# Patient Record
Sex: MALE | Race: WHITE | NOT HISPANIC OR LATINO | Employment: PART TIME | URBAN - METROPOLITAN AREA
[De-identification: names, ages, dates, MRNs, and addresses within clinical notes are randomized per-mention and may not be internally consistent; named-entity substitution may affect disease eponyms.]

---

## 2017-03-06 ENCOUNTER — ALLSCRIPTS OFFICE VISIT (OUTPATIENT)
Dept: OTHER | Facility: OTHER | Age: 22
End: 2017-03-06

## 2017-06-19 ENCOUNTER — ALLSCRIPTS OFFICE VISIT (OUTPATIENT)
Dept: OTHER | Facility: OTHER | Age: 22
End: 2017-06-19

## 2018-01-12 VITALS
BODY MASS INDEX: 26.47 KG/M2 | HEIGHT: 74 IN | TEMPERATURE: 99.2 F | DIASTOLIC BLOOD PRESSURE: 68 MMHG | SYSTOLIC BLOOD PRESSURE: 122 MMHG | HEART RATE: 80 BPM | RESPIRATION RATE: 16 BRPM | WEIGHT: 206.25 LBS

## 2018-01-14 VITALS
TEMPERATURE: 97.1 F | HEIGHT: 74 IN | DIASTOLIC BLOOD PRESSURE: 72 MMHG | RESPIRATION RATE: 16 BRPM | BODY MASS INDEX: 27.35 KG/M2 | SYSTOLIC BLOOD PRESSURE: 114 MMHG | HEART RATE: 68 BPM | WEIGHT: 213.13 LBS

## 2018-01-15 NOTE — PROGRESS NOTES
Assessment    1  Encounter for preventive health examination (V70 0) (Z00 00)    Discussion/Summary    Impression:   No growth, development, elimination, feeding and skin concerns  no medical problems  Anticipatory guidance addressed as per the history of present illness section  No vaccines needed  He is not on any medications  27year-old male for college and [facility] physical  All immunizations up-to-date  Low risk medically for [facility] participation  Forms completed  Self Referrals: No      History of Present Illness  HM, 12-18 years Male (Brief): Madonna Saenz presents today for routine health maintenance with his mother  General Health: The child's health since the last visit is described as good  Dental hygiene: Good  Immunization status: Up to date  Caregiver concerns:   Caregivers deny concerns regarding nutrition, sleep, behavior, development and elimination  Nutrition/Elimination:   Diet:  his current diet is diverse and healthy  No elimination issues are expressed  Sleep:  No sleep issues are reported  Behavior: The child's temperament is described as calm, happy and independent  No behavior issues identified  Health Risks:  No significant risk factors are identified  Safety elements used:   safety elements were discussed and are adequate  Childcare/School: He is College  School performance has been excellent  Sports Participation Questions:   HPI: 27year-old male presents for college and [facility] physical  The patient has no medical problems and is up-to-date with immunizations  No history of exertional dyspnea, chest pain, syncope  No premature cardiac death in the family  Review of Systems    Constitutional: not feeling tired, no fever, not feeling poorly and no chills  Eyes: no eye pain and no eyesight problems  ENT: no nasal discharge  Cardiovascular: no chest pain and no lower extremity edema     Respiratory: no wheezing, no shortness of breath, no cough and no shortness of breath during exertion  Gastrointestinal: no abdominal pain, no nausea, no vomiting and no diarrhea  Integumentary: no rashes  Neurological: no headache, no numbness, no tingling and no dizziness  Hematologic/Lymphatic: no swollen glands  ROS reported by the patient  Active Problems    1  History of Need for HPV vaccination (V04 89) (Z23)   2  History of Need for HPV vaccination (V04 89) (Z23)   3  History of Need for HPV vaccination (V04 89) (Z23)   4  History of Need for prophylactic vaccination and inoculation against bacterial diseases   (V03 9) (Z23)    Past Medical History    · History of Abrasion Of Right Foot (917 0)   · History of Acute upper respiratory infection (465 9) (J06 9)   · History of shortness of breath (V13 89) (J89 874)   · History of Need for HPV vaccination (V04 89) (Z23)   · History of Need for HPV vaccination (V04 89) (Z23)   · History of Need for HPV vaccination (V04 89) (Z23)   · History of Need for prophylactic vaccination and inoculation against bacterial diseases  (V03 9) (Z23)    Surgical History    · History of Myringoplasty   · History of Oral Surgery Tooth Extraction    Family History  Mother    · Family history of Hyperlipidemia  Maternal Grandfather    · Family history of Diabetes Mellitus (V18 0)   · Family history of malignant neoplasm of prostate (V16 42) (Z80 42)   · Family history of pancreatic cancer (V16 0) (Z80 0)   · Family history of Hypertension (V17 49)    Social History    · Never A Smoker   · Uses Safety Equipment - Seatbelts    Current Meds   1  No Reported Medications Recorded    Allergies    1  No Known Drug Allergies    2  Dust   3  Other   4   Pollen    Vitals   Recorded: 23UOH4288 08:59AM   Temperature 97 8 F   Heart Rate 72   Respiration 14   Systolic 258   Diastolic 60   Height 6 ft 1 5 in   Weight 195 lb 6 08 oz   BMI Calculated 25 43   BSA Calculated 2 14   Pain Scale 0     Physical Exam    Constitutional - General appearance: No acute distress, well appearing and well nourished  Head and Face - Head and face: Normocephalic, atraumatic  Eyes - Conjunctiva and lids: No injection, edema or discharge  Pupils and irises: Equal, round, reactive to light bilaterally  Ears, Nose, Mouth, and Throat - External inspection of ears and nose: Normal without deformities or discharge  Oropharynx: Moist mucosa, normal tongue and tonsils without lesions  Neck - Neck: Supple, symmetric, no masses  Pulmonary - Respiratory effort: Normal respiratory rate and rhythm, no increased work of breathing  Auscultation of lungs: Clear bilaterally  Cardiovascular - Auscultation of heart: Regular rate and rhythm, normal S1 and S2, no murmur  Examination of extremities for edema and/or varicosities: Normal    Chest - Chest: Normal    Abdomen - Abdomen: Normal bowel sounds, soft, non-tender, no masses  Lymphatic - Palpation of lymph nodes in neck: No anterior or posterior cervical lymphadenopathy  Musculoskeletal - Gait and station: Normal gait  Range of motion: Normal  Muscle strength/tone: Normal    Skin - Skin and subcutaneous tissue: No rash or lesions  Neurologic - Cranial nerves: Normal  Coordination: Normal    Psychiatric - judgment and insight: Normal  Mood and affect: Normal       Signatures   Electronically signed by :  Deon Engel MD; Jun 16 2016 10:04AM EST                       (Author)

## 2018-05-16 DIAGNOSIS — R45.89 DEPRESSED MOOD: ICD-10-CM

## 2018-05-16 DIAGNOSIS — R53.83 OTHER FATIGUE: Primary | ICD-10-CM

## 2018-05-16 DIAGNOSIS — Z13.220 SCREENING FOR LIPOID DISORDERS: ICD-10-CM

## 2018-05-17 ENCOUNTER — TELEPHONE (OUTPATIENT)
Dept: FAMILY MEDICINE CLINIC | Facility: CLINIC | Age: 23
End: 2018-05-17

## 2018-05-17 NOTE — TELEPHONE ENCOUNTER
Lab papers are up form fro  or if they call back please ask where they would like them faxed to   Which [facility] location

## 2018-05-22 LAB
ALBUMIN SERPL-MCNC: 4.3 G/DL (ref 3.6–5.1)
ALBUMIN/GLOB SERPL: 1.8 (CALC) (ref 1–2.5)
ALP SERPL-CCNC: 125 U/L (ref 40–115)
ALT SERPL-CCNC: 29 U/L (ref 9–46)
AST SERPL-CCNC: 22 U/L (ref 10–40)
BASOPHILS # BLD AUTO: 78 CELLS/UL (ref 0–200)
BASOPHILS NFR BLD AUTO: 1.1 %
BILIRUB SERPL-MCNC: 0.4 MG/DL (ref 0.2–1.2)
BUN SERPL-MCNC: 10 MG/DL (ref 7–25)
BUN/CREAT SERPL: ABNORMAL (CALC) (ref 6–22)
CALCIUM SERPL-MCNC: 9.7 MG/DL (ref 8.6–10.3)
CHLORIDE SERPL-SCNC: 103 MMOL/L (ref 98–110)
CHOLEST SERPL-MCNC: 145 MG/DL
CHOLEST/HDLC SERPL: 4.1 (CALC)
CO2 SERPL-SCNC: 28 MMOL/L (ref 20–31)
CREAT SERPL-MCNC: 0.98 MG/DL (ref 0.6–1.35)
EOSINOPHIL # BLD AUTO: 114 CELLS/UL (ref 15–500)
EOSINOPHIL NFR BLD AUTO: 1.6 %
ERYTHROCYTE [DISTWIDTH] IN BLOOD BY AUTOMATED COUNT: 12.1 % (ref 11–15)
GLOBULIN SER CALC-MCNC: 2.4 G/DL (CALC) (ref 1.9–3.7)
GLUCOSE SERPL-MCNC: 89 MG/DL (ref 65–99)
HCT VFR BLD AUTO: 46.2 % (ref 38.5–50)
HDLC SERPL-MCNC: 35 MG/DL
HGB BLD-MCNC: 15.9 G/DL (ref 13.2–17.1)
LDLC SERPL CALC-MCNC: 85 MG/DL (CALC)
LYMPHOCYTES # BLD AUTO: 2499 CELLS/UL (ref 850–3900)
LYMPHOCYTES NFR BLD AUTO: 35.2 %
MCH RBC QN AUTO: 29.1 PG (ref 27–33)
MCHC RBC AUTO-ENTMCNC: 34.4 G/DL (ref 32–36)
MCV RBC AUTO: 84.5 FL (ref 80–100)
MONOCYTES # BLD AUTO: 575 CELLS/UL (ref 200–950)
MONOCYTES NFR BLD AUTO: 8.1 %
NEUTROPHILS # BLD AUTO: 3834 CELLS/UL (ref 1500–7800)
NEUTROPHILS NFR BLD AUTO: 54 %
NONHDLC SERPL-MCNC: 110 MG/DL (CALC)
PLATELET # BLD AUTO: 311 THOUSAND/UL (ref 140–400)
PMV BLD REES-ECKER: 10.4 FL (ref 7.5–12.5)
POTASSIUM SERPL-SCNC: 4.5 MMOL/L (ref 3.5–5.3)
PROT SERPL-MCNC: 6.7 G/DL (ref 6.1–8.1)
RBC # BLD AUTO: 5.47 MILLION/UL (ref 4.2–5.8)
SL AMB EGFR AFRICAN AMERICAN: 128 ML/MIN/1.73M2
SL AMB EGFR NON AFRICAN AMERICAN: 111 ML/MIN/1.73M2
SODIUM SERPL-SCNC: 139 MMOL/L (ref 135–146)
TRIGL SERPL-MCNC: 149 MG/DL
TSH SERPL-ACNC: 1.92 MIU/L (ref 0.4–4.5)
WBC # BLD AUTO: 7.1 THOUSAND/UL (ref 3.8–10.8)

## 2018-07-10 ENCOUNTER — TELEPHONE (OUTPATIENT)
Dept: FAMILY MEDICINE CLINIC | Facility: CLINIC | Age: 23
End: 2018-07-10

## 2018-07-26 ENCOUNTER — OFFICE VISIT (OUTPATIENT)
Dept: FAMILY MEDICINE CLINIC | Facility: CLINIC | Age: 23
End: 2018-07-26
Payer: COMMERCIAL

## 2018-07-26 VITALS
WEIGHT: 245 LBS | DIASTOLIC BLOOD PRESSURE: 70 MMHG | HEART RATE: 82 BPM | BODY MASS INDEX: 32.47 KG/M2 | SYSTOLIC BLOOD PRESSURE: 112 MMHG | TEMPERATURE: 98.1 F | RESPIRATION RATE: 18 BRPM | HEIGHT: 73 IN

## 2018-07-26 DIAGNOSIS — F32.1 CURRENT MODERATE EPISODE OF MAJOR DEPRESSIVE DISORDER WITHOUT PRIOR EPISODE (HCC): Primary | ICD-10-CM

## 2018-07-26 PROCEDURE — 1036F TOBACCO NON-USER: CPT | Performed by: FAMILY MEDICINE

## 2018-07-26 PROCEDURE — 3008F BODY MASS INDEX DOCD: CPT | Performed by: FAMILY MEDICINE

## 2018-07-26 PROCEDURE — 99213 OFFICE O/P EST LOW 20 MIN: CPT | Performed by: FAMILY MEDICINE

## 2018-07-26 RX ORDER — SERTRALINE HYDROCHLORIDE 25 MG/1
1 TABLET, FILM COATED ORAL DAILY
COMMUNITY
Start: 2017-03-06 | End: 2018-07-26 | Stop reason: SDUPTHER

## 2018-07-26 NOTE — ASSESSMENT & PLAN NOTE
Persistently depressed, with PHQ-9 of 8 and several days of the week feeling passive thoughts of not wanting to be alive  Increased sertraline to 50mg  Recommended on-campus counseling  Verbally contracted patient to call me if symptoms worsen

## 2018-07-26 NOTE — PROGRESS NOTES
Assessment/Plan     Current moderate episode of major depressive disorder without prior episode ([address] )  Persistently depressed, with PHQ-9 of 8 and several days of the week feeling passive thoughts of not wanting to be alive  Increased sertraline to 50mg  Recommended on-campus counseling  Verbally contracted patient to call me if symptoms worsen  Pt will come back to see me over breaks from school when able  Return/ED precautions discussed for depression  Subjective     Chief Complaint: f/u depression    HPI:   HPI     Seen in followup of MDD  Feels that his sertraline helps but that his depression is persistent  Notes decreased interest in his normal activities  Has racing thoughts affecting his sleep  Decreased energy  No current SI, though over the summer he's had some thoughts of self-harm  Feels that he's "constantly letting his father down "    Also has occasional panic attacks, sometimes provoked and sometimes unprovoked  The following portions of the patient's history were reviewed and updated as appropriate: allergies, current medications, past family history, past medical history, past social history, past surgical history and problem list     Review of Systems  Review of Systems   Constitutional: Negative for activity change, chills, fatigue and fever  HENT: Negative for congestion, sinus pain, sinus pressure and sore throat  Respiratory: Negative for cough, shortness of breath and wheezing  Cardiovascular: Negative for chest pain, palpitations and leg swelling  Gastrointestinal: Negative for abdominal pain, diarrhea, nausea and vomiting  Genitourinary: Negative for decreased urine volume, dysuria, frequency and urgency  Musculoskeletal: Negative for arthralgias, myalgias, neck pain and neck stiffness  Skin: Negative for rash  Neurological: Negative for dizziness, light-headedness, numbness and headaches     Psychiatric/Behavioral: Positive for dysphoric mood and suicidal ideas  Objective   Vitals:    07/26/18 1044   BP: 112/70   Pulse: 82   Resp: 18   Temp: 98 1 °F (36 7 °C)       Physical Exam   Constitutional: He is oriented to person, place, and time  He appears well-developed and well-nourished  No distress  HENT:   Head: Normocephalic and atraumatic  Eyes: EOM are normal  Pupils are equal, round, and reactive to light  Neck: Normal range of motion  Neck supple  Cardiovascular: Normal rate, regular rhythm and normal heart sounds  Pulmonary/Chest: Effort normal and breath sounds normal  No respiratory distress  Abdominal: Soft  Bowel sounds are normal  There is no tenderness  Musculoskeletal: Normal range of motion  Neurological: He is alert and oriented to person, place, and time  Skin: Skin is warm and dry  He is not diaphoretic  Scattered stretch marks over abdomen  Psychiatric: He has a normal mood and affect  His behavior is normal  Judgment and thought content normal        Lab Results: I have reviewed all the lab results

## 2018-12-02 DIAGNOSIS — F32.1 CURRENT MODERATE EPISODE OF MAJOR DEPRESSIVE DISORDER WITHOUT PRIOR EPISODE (HCC): ICD-10-CM

## 2019-07-15 ENCOUNTER — OFFICE VISIT (OUTPATIENT)
Dept: PODIATRY | Facility: CLINIC | Age: 24
End: 2019-07-15
Payer: COMMERCIAL

## 2019-07-15 VITALS
HEART RATE: 84 BPM | SYSTOLIC BLOOD PRESSURE: 114 MMHG | DIASTOLIC BLOOD PRESSURE: 80 MMHG | WEIGHT: 236.8 LBS | BODY MASS INDEX: 31.38 KG/M2 | HEIGHT: 73 IN

## 2019-07-15 DIAGNOSIS — M20.42 HAMMERTOE OF LEFT FOOT: ICD-10-CM

## 2019-07-15 DIAGNOSIS — L60.3 NAIL DYSTROPHY: Primary | ICD-10-CM

## 2019-07-15 PROCEDURE — 99213 OFFICE O/P EST LOW 20 MIN: CPT | Performed by: PODIATRIST

## 2019-07-15 RX ORDER — ALBUTEROL SULFATE 90 UG/1
2 AEROSOL, METERED RESPIRATORY (INHALATION) EVERY 6 HOURS PRN
COMMUNITY
Start: 2016-10-29 | End: 2021-05-10 | Stop reason: ALTCHOICE

## 2019-07-15 NOTE — PROGRESS NOTES
Assessment/Plan:       Diagnoses and all orders for this visit:    Nail dystrophy    Hammertoe of left foot    Other orders  -     albuterol (PROVENTIL HFA) 90 mcg/act inhaler; Inhale 2 puffs every 6 (six) hours as needed      Right hallux nail is mildly thickened but normal  No further care  Educated on nail care and foot hygiene  The previous matrixectomy worked well without complication  LEft 5th adductovarus hammertoe causing repetitive nail trauma  It does not hurt him  I could surgically straighten the toe but I would not recommend unless it is causing daily pain, monitor for now  Call as needed ,         Subjective:      Patient ID: Kylah Montero is a 27 y o  male  PAtient has 2 conserns today  Previously he had right matrixectomy to both right great toe nail corners and never had it checked  The nail is thicker than it used to be  On the left foot, he reports pain to the 5th toe  "It's a mess " The nail is irregular  It has not fallen off and is not painful  IT is just really small and "funny looking "       The following portions of the patient's history were reviewed and updated as appropriate: allergies, current medications, past family history, past medical history, past social history, past surgical history and problem list     Review of Systems   Constitutional: Negative for chills and fever  Respiratory: Negative for shortness of breath  Gastrointestinal: Negative for diarrhea and nausea  Skin: Positive for color change  Neurological: Negative for numbness  Objective:      /80   Pulse 84   Ht 6' 1" (1 854 m)   Wt 107 kg (236 lb 12 8 oz)   BMI 31 24 kg/m²          Physical Exam   Constitutional: He appears well-developed and well-nourished  No distress  Cardiovascular:   Pulses:       Dorsalis pedis pulses are 2+ on the right side, and 2+ on the left side  Posterior tibial pulses are 2+ on the right side, and 2+ on the left side     Musculoskeletal: Right foot: There is normal range of motion and no deformity  Left foot: There is deformity (5th adductovarus hammertoe without pain)  There is normal range of motion  Feet:   Right Foot:   Protective Sensation: 10 sites tested  10 sites sensed  Skin Integrity: Positive for skin breakdown (hallux nail is mildly thickened compared to other nails, no evidence of fungal infection)  Left Foot:   Protective Sensation: 10 sites tested  10 sites sensed  Skin Integrity: Positive for skin breakdown (5th toenail is irregular in shape with beaus lines  )  Vitals reviewed

## 2019-08-02 ENCOUNTER — OFFICE VISIT (OUTPATIENT)
Dept: FAMILY MEDICINE CLINIC | Facility: CLINIC | Age: 24
End: 2019-08-02
Payer: COMMERCIAL

## 2019-08-02 VITALS
HEART RATE: 82 BPM | HEIGHT: 73 IN | WEIGHT: 235.6 LBS | TEMPERATURE: 99.5 F | BODY MASS INDEX: 31.23 KG/M2 | RESPIRATION RATE: 16 BRPM | SYSTOLIC BLOOD PRESSURE: 110 MMHG | DIASTOLIC BLOOD PRESSURE: 80 MMHG

## 2019-08-02 DIAGNOSIS — F32.4 MAJOR DEPRESSIVE DISORDER WITH SINGLE EPISODE, IN PARTIAL REMISSION (HCC): ICD-10-CM

## 2019-08-02 DIAGNOSIS — F32.1 CURRENT MODERATE EPISODE OF MAJOR DEPRESSIVE DISORDER WITHOUT PRIOR EPISODE (HCC): ICD-10-CM

## 2019-08-02 DIAGNOSIS — Z13.220 SCREENING FOR LIPOID DISORDERS: Primary | ICD-10-CM

## 2019-08-02 PROBLEM — Z00.00 HEALTHCARE MAINTENANCE: Status: ACTIVE | Noted: 2019-08-02

## 2019-08-02 PROCEDURE — 99214 OFFICE O/P EST MOD 30 MIN: CPT | Performed by: FAMILY MEDICINE

## 2019-08-02 NOTE — ASSESSMENT & PLAN NOTE
Doing better than earlier in summer  Stable off of sertraline  Notably better since addition of cat to home  If able, may consider taking cat to school with him, which I would support

## 2019-08-02 NOTE — PROGRESS NOTES
Family Medicine Follow-Up Office Visit  Сергей Shelley 27 y o  male   MRN: [id number] : 1995  ENCOUNTER: 2019 3:04 PM    Assessment and Plan   Healthcare maintenance  Check lipids and CMP due to poor nutrition and obesity  Major depressive disorder with single episode, in partial remission ([address] )  Doing better than earlier in summer  Stable off of sertraline  Notably better since addition of cat to home  If able, may consider taking cat to school with him, which I would support  RTC 6 mo, will call with lab results    Chief Complaint     Chief Complaint   Patient presents with    Physical Exam       History of Present Illness   Сергей Shelley is a 27y o -year-old male who presents today for annual checkup and followup of major depressive disorder  He has been doing well  He did have a "rough breakup" earlier in the summer, and has had a hard time getting along with his dad this summer  Weaned himself off of sertraline with my guidance earlier this year  Nutrition is spotty due to work schedule - fair amount of fast food and processed stuff  Little organized exercise  Review of Systems   Review of Systems   Constitutional: Negative for activity change, chills, fatigue and fever  HENT: Negative for congestion, sinus pressure, sinus pain and sore throat  Respiratory: Negative for cough, shortness of breath and wheezing  Cardiovascular: Negative for chest pain, palpitations and leg swelling  Gastrointestinal: Negative for abdominal pain, diarrhea, nausea and vomiting  Genitourinary: Negative for decreased urine volume, dysuria, frequency and urgency  Musculoskeletal: Negative for arthralgias, myalgias, neck pain and neck stiffness  Skin: Negative for rash  Neurological: Negative for dizziness, light-headedness, numbness and headaches         Active Problem List     Patient Active Problem List   Diagnosis    Other fatigue    Depressed mood    Screening for lipoid disorders    Major depressive disorder with single episode, in partial remission ([facility] [address] )    Healthcare maintenance       Past Medical History, Past Surgical History, Family History, and Social History were reviewed and updated today as appropriate  Objective   /80 (BP Location: Left arm, Patient Position: Sitting, Cuff Size: Adult)   Pulse 82   Temp 99 5 °F (37 5 °C) (Tympanic)   Resp 16   Ht 6' 0 5" (1 842 m)   Wt 107 kg (235 lb 9 6 oz)   BMI 31 51 kg/m²     Physical Exam   Constitutional: He is oriented to person, place, and time  He appears well-developed and well-nourished  No distress  HENT:   Head: Normocephalic and atraumatic  Mouth/Throat: Oropharynx is clear and moist    Eyes: Pupils are equal, round, and reactive to light  Neck: Normal range of motion  Neck supple  Cardiovascular: Normal rate, regular rhythm and normal heart sounds  Exam reveals no gallop and no friction rub  No murmur heard  Pulmonary/Chest: Effort normal and breath sounds normal  No respiratory distress  He has no wheezes  He has no rales  Abdominal: Soft  There is no tenderness  Musculoskeletal: Normal range of motion  Neurological: He is alert and oriented to person, place, and time  Psychiatric: He has a normal mood and affect   Thought content normal      Diabetic Foot Exam    Pertinent Laboratory/Diagnostic Studies:  Lab Results   Component Value Date    BUN 10 05/21/2018    CREATININE 0 98 05/21/2018    CALCIUM 9 7 05/21/2018    K 4 5 05/21/2018    CO2 28 05/21/2018     05/21/2018     Lab Results   Component Value Date    ALT 29 05/21/2018    AST 22 05/21/2018    ALKPHOS 125 (H) 05/21/2018       Lab Results   Component Value Date    WBC 7 1 05/21/2018    HGB 15 9 05/21/2018    HCT 46 2 05/21/2018    MCV 84 5 05/21/2018     05/21/2018       No results found for: TSH    No results found for: CHOL  Lab Results   Component Value Date    TRIG 149 05/21/2018     Lab Results   Component Value Date    HDL 35 (L) 05/21/2018     No results found for: LDLCALC  No results found for: HGBA1C    Results for orders placed or performed in visit on 05/21/18   Lipid Panel with Direct LDL reflex   Result Value Ref Range    Total Cholesterol 145 <200 mg/dL    HDL 35 (L) >40 mg/dL    Triglycerides 149 <150 mg/dL    LDL Direct 85 mg/dL (calc)    Chol HDLC Ratio 4 1 <5 0 (calc)    Non-HDL Cholesterol 110 <130 mg/dL (calc)   Comprehensive metabolic panel   Result Value Ref Range    Glucose, Random 89 65 - 99 mg/dL    BUN 10 7 - 25 mg/dL    Creatinine 0 98 0 60 - 1 35 mg/dL    eGFR Non  111 > OR = 60 mL/min/1 73m2    eGFR  128 > OR = 60 mL/min/1 73m2    SL AMB BUN/CREATININE RATIO NOT APPLICABLE 6 - 22 (calc)    Sodium 139 135 - 146 mmol/L    Potassium 4 5 3 5 - 5 3 mmol/L    Chloride 103 98 - 110 mmol/L    CO2 28 20 - 31 mmol/L    SL AMB CALCIUM 9 7 8 6 - 10 3 mg/dL    Protein, Total 6 7 6 1 - 8 1 g/dL    Albumin 4 3 3 6 - 5 1 g/dL    Globulin 2 4 1 9 - 3 7 g/dL (calc)    Albumin/Globulin Ratio 1 8 1 0 - 2 5 (calc)    TOTAL BILIRUBIN 0 4 0 2 - 1 2 mg/dL    Alkaline Phosphatase 125 (H) 40 - 115 U/L    AST 22 10 - 40 U/L    ALT 29 9 - 46 U/L   CBC and differential   Result Value Ref Range    White Blood Cell Count 7 1 3 8 - 10 8 Thousand/uL    Red Blood Cell Count 5 47 4 20 - 5 80 Million/uL    Hemoglobin 15 9 13 2 - 17 1 g/dL    HCT 46 2 38 5 - 50 0 %    MCV 84 5 80 0 - 100 0 fL    MCH 29 1 27 0 - 33 0 pg    MCHC 34 4 32 0 - 36 0 g/dL    RDW 12 1 11 0 - 15 0 %    Platelet Count 717 609 - 400 Thousand/uL    SL AMB MPV 10 4 7 5 - 12 5 fL    Neutrophils (Absolute) 3,834 1,500 - 7,800 cells/uL    Lymphocytes (Absolute) 2,499 850 - 3,900 cells/uL    Monocytes (Absolute) 575 200 - 950 cells/uL    Eosinophils (Absolute) 114 15 - 500 cells/uL    Basophils ABS 78 0 - 200 cells/uL    Neutrophils 54 %    Lymphocytes 35 2 %    Monocytes 8 1 %    Eosinophils 1 6 %    Basophils PCT 1 1 %   TSH, 3rd generation with T4 reflex   Result Value Ref Range    TSH W/RFX TO FREE T4 1 92 0 40 - 4 50 mIU/L       Orders Placed This Encounter   Procedures    Lipid Panel with Direct LDL reflex    TSH, 3rd generation with Free T4 reflex    Comprehensive metabolic panel         Current Medications     Current Outpatient Medications   Medication Sig Dispense Refill    albuterol (PROVENTIL HFA) 90 mcg/act inhaler Inhale 2 puffs every 6 (six) hours as needed       No current facility-administered medications for this visit  ALLERGIES:  No Known Allergies    Health Maintenance     Health Maintenance   Topic Date Due    BMI: Followup Plan  07/13/2015    INFLUENZA VACCINE  07/01/2019    BMI: Adult  07/15/2020    DTaP,Tdap,and Td Vaccines (7 - Td) 07/21/2020    Pneumococcal Vaccine: 65+ Years (1 of 2 - PCV13) 07/13/2062    HEPATITIS B VACCINES  Completed    Pneumococcal Vaccine: Pediatrics (0 to 5 Years) and At-Risk Patients (6 to 59 Years)  Aged [employer] History   Administered Date(s) Administered    DTaP 5 1997, 1997, 02/16/1998, 02/11/1999, 05/01/2003    HPV Quadrivalent 08/08/2013, 09/17/2013, 02/11/2014    Hep B, Adolescent or Pediatric 1997, 1997, 05/14/1998    Hib (PRP-T) 1997, 1997, 02/11/1999    INFLUENZA 09/21/2012, 09/30/2013    IPV 1995, 1997, 02/11/1999, 07/06/2004    MMR 11/12/1998, 05/01/2003    Meningococcal MCV4P 07/04/2010, 08/08/2013    Meningococcal, Unknown Serogroups 07/21/2010, 08/08/2013    Tdap 07/21/2010    Varicella 08/02/1999, 08/13/2009         Li Awad MD   [address]  8/2/2019  3:04 PM    Parts of this note were dictated using [employer] dictation software and may have sounds-like errors due to variation in pronunciation

## 2019-08-21 ENCOUNTER — OFFICE VISIT (OUTPATIENT)
Dept: FAMILY MEDICINE CLINIC | Facility: CLINIC | Age: 24
End: 2019-08-21
Payer: COMMERCIAL

## 2019-08-21 VITALS
SYSTOLIC BLOOD PRESSURE: 116 MMHG | HEART RATE: 80 BPM | WEIGHT: 232.6 LBS | BODY MASS INDEX: 30.83 KG/M2 | HEIGHT: 73 IN | DIASTOLIC BLOOD PRESSURE: 70 MMHG | RESPIRATION RATE: 16 BRPM | TEMPERATURE: 99.1 F

## 2019-08-21 DIAGNOSIS — R05.8 POST-VIRAL COUGH SYNDROME: Primary | ICD-10-CM

## 2019-08-21 PROBLEM — G93.3 POST VIRAL SYNDROME: Status: ACTIVE | Noted: 2019-08-21

## 2019-08-21 PROBLEM — G93.31 POST VIRAL SYNDROME: Status: ACTIVE | Noted: 2019-08-21

## 2019-08-21 PROCEDURE — 3008F BODY MASS INDEX DOCD: CPT | Performed by: FAMILY MEDICINE

## 2019-08-21 PROCEDURE — 99214 OFFICE O/P EST MOD 30 MIN: CPT | Performed by: FAMILY MEDICINE

## 2019-08-21 PROCEDURE — 1036F TOBACCO NON-USER: CPT | Performed by: FAMILY MEDICINE

## 2019-08-21 NOTE — PROGRESS NOTES
Family Medicine Follow-Up Office Visit  Kaushal Valle 27 y o  male   MRN: [id number] : 1995  ENCOUNTER: 2019 9:08 PM    Assessment and Plan   Post-viral cough syndrome  Patient recently diagnosed with bronchitis and treated with azithromycin  Cough persists but is slowly improving  Educated patient on possibility of cough lasting up to 6 weeks    Recommended OTC cough suppressant as needed  Follow up in 3 months for flu vaccine      Chief Complaint   Cough    History of Present Illness   Kaushal Valle is a 27y o -year-old male who presents today for a cough  Patient states his symptoms began  with cough, rhinorrhea, and sore throat  Was seen at [facility] urgent care and was diagnosed with bronchitis and treated with azithromycin  His symptoms have subsided except for the cough which is non productive and intermittent  Review of Systems   Review of Systems   Constitutional: Negative for appetite change, chills, fatigue and fever  HENT: Negative for congestion, ear pain, rhinorrhea, sinus pain and sore throat  Respiratory: Positive for cough (intermittent non productive cough)  Negative for chest tightness, shortness of breath and wheezing  Gastrointestinal: Negative for abdominal pain, constipation, diarrhea, nausea and vomiting  Active Problem List     Patient Active Problem List   Diagnosis    Other fatigue    Depressed mood    Screening for lipoid disorders    Major depressive disorder with single episode, in partial remission ([facility] [address] )    Healthcare maintenance    Post-viral cough syndrome       Past Medical History, Past Surgical History, Family History, and Social History were reviewed and updated today as appropriate      Objective   /70 (BP Location: Left arm, Patient Position: Sitting, Cuff Size: Thigh)   Pulse 80   Temp 99 1 °F (37 3 °C) (Tympanic)   Resp 16   Ht 6' 0 5" (1 842 m)   Wt 106 kg (232 lb 9 6 oz)   BMI 31 11 kg/m²     Physical Exam Constitutional: He appears well-developed and well-nourished  No distress  HENT:   Head: Normocephalic and atraumatic  Nose: Nose normal    Mouth/Throat: Oropharynx is clear and moist    Cardiovascular: Normal rate, regular rhythm and normal heart sounds  No murmur heard  Pulmonary/Chest: Effort normal and breath sounds normal  No respiratory distress  He has no wheezes  Abdominal: Soft  Bowel sounds are normal  There is no tenderness  Skin: Skin is warm and dry  He is not diaphoretic  No erythema       Diabetic Foot Exam    Pertinent Laboratory/Diagnostic Studies:  Lab Results   Component Value Date    BUN 10 05/21/2018    CREATININE 0 98 05/21/2018    CALCIUM 9 7 05/21/2018    K 4 5 05/21/2018    CO2 28 05/21/2018     05/21/2018     Lab Results   Component Value Date    ALT 29 05/21/2018    AST 22 05/21/2018    ALKPHOS 125 (H) 05/21/2018       Lab Results   Component Value Date    WBC 7 1 05/21/2018    HGB 15 9 05/21/2018    HCT 46 2 05/21/2018    MCV 84 5 05/21/2018     05/21/2018       No results found for: TSH    No results found for: CHOL  Lab Results   Component Value Date    TRIG 149 05/21/2018     Lab Results   Component Value Date    HDL 35 (L) 05/21/2018     No results found for: LDLCALC  No results found for: HGBA1C    Results for orders placed or performed in visit on 05/21/18   Lipid Panel with Direct LDL reflex   Result Value Ref Range    Total Cholesterol 145 <200 mg/dL    HDL 35 (L) >40 mg/dL    Triglycerides 149 <150 mg/dL    LDL Direct 85 mg/dL (calc)    Chol HDLC Ratio 4 1 <5 0 (calc)    Non-HDL Cholesterol 110 <130 mg/dL (calc)   Comprehensive metabolic panel   Result Value Ref Range    Glucose, Random 89 65 - 99 mg/dL    BUN 10 7 - 25 mg/dL    Creatinine 0 98 0 60 - 1 35 mg/dL    eGFR Non  111 > OR = 60 mL/min/1 73m2    eGFR  128 > OR = 60 mL/min/1 73m2    SL AMB BUN/CREATININE RATIO NOT APPLICABLE 6 - 22 (calc)    Sodium 139 135 - 146 mmol/L    Potassium 4 5 3 5 - 5 3 mmol/L    Chloride 103 98 - 110 mmol/L    CO2 28 20 - 31 mmol/L    SL AMB CALCIUM 9 7 8 6 - 10 3 mg/dL    Protein, Total 6 7 6 1 - 8 1 g/dL    Albumin 4 3 3 6 - 5 1 g/dL    Globulin 2 4 1 9 - 3 7 g/dL (calc)    Albumin/Globulin Ratio 1 8 1 0 - 2 5 (calc)    TOTAL BILIRUBIN 0 4 0 2 - 1 2 mg/dL    Alkaline Phosphatase 125 (H) 40 - 115 U/L    AST 22 10 - 40 U/L    ALT 29 9 - 46 U/L   CBC and differential   Result Value Ref Range    White Blood Cell Count 7 1 3 8 - 10 8 Thousand/uL    Red Blood Cell Count 5 47 4 20 - 5 80 Million/uL    Hemoglobin 15 9 13 2 - 17 1 g/dL    HCT 46 2 38 5 - 50 0 %    MCV 84 5 80 0 - 100 0 fL    MCH 29 1 27 0 - 33 0 pg    MCHC 34 4 32 0 - 36 0 g/dL    RDW 12 1 11 0 - 15 0 %    Platelet Count 808 318 - 400 Thousand/uL    SL AMB MPV 10 4 7 5 - 12 5 fL    Neutrophils (Absolute) 3,834 1,500 - 7,800 cells/uL    Lymphocytes (Absolute) 2,499 850 - 3,900 cells/uL    Monocytes (Absolute) 575 200 - 950 cells/uL    Eosinophils (Absolute) 114 15 - 500 cells/uL    Basophils ABS 78 0 - 200 cells/uL    Neutrophils 54 %    Lymphocytes 35 2 %    Monocytes 8 1 %    Eosinophils 1 6 %    Basophils PCT 1 1 %   TSH, 3rd generation with T4 reflex   Result Value Ref Range    TSH W/RFX TO FREE T4 1 92 0 40 - 4 50 mIU/L       No orders of the defined types were placed in this encounter  Current Medications     Current Outpatient Medications   Medication Sig Dispense Refill    albuterol (PROVENTIL HFA) 90 mcg/act inhaler Inhale 2 puffs every 6 (six) hours as needed       No current facility-administered medications for this visit          ALLERGIES:  No Known Allergies    Health Maintenance     Health Maintenance   Topic Date Due    BMI: Followup Plan  07/13/2015    INFLUENZA VACCINE  07/01/2019    DTaP,Tdap,and Td Vaccines (7 - Td) 07/21/2020    BMI: Adult  08/21/2020    Pneumococcal Vaccine: 65+ Years (1 of 2 - PCV13) 07/13/2062    HEPATITIS B VACCINES  Completed    Pneumococcal Vaccine: Pediatrics (0 to 5 Years) and At-Risk Patients (6 to 59 Years)  Aged [employer] History   Administered Date(s) Administered    DTaP 5 1997, 1997, 02/16/1998, 02/11/1999, 05/01/2003    HPV Quadrivalent 08/08/2013, 09/17/2013, 02/11/2014    Hep B, Adolescent or Pediatric 1997, 1997, 05/14/1998    Hib (PRP-T) 1997, 1997, 02/11/1999    INFLUENZA 09/21/2012, 09/30/2013    IPV 1997, 1997, 02/11/1999, 07/06/2004    MMR 11/12/1998, 05/01/2003    Meningococcal MCV4P 07/04/2010, 08/08/2013    Meningococcal, Unknown Serogroups 07/21/2010, 08/08/2013    Tdap 07/21/2010    Varicella 08/02/1999, 08/13/2009         Darlene Bernal MD   [address]  8/21/2019  9:08 PM    Parts of this note were dictated using [employer] dictation software and may have sounds-like errors due to variation in pronunciation

## 2019-08-22 NOTE — ASSESSMENT & PLAN NOTE
Patient recently diagnosed with bronchitis and treated with azithromycin  Cough persists but is slowly improving  Educated patient on possibility of cough lasting up to 6 weeks    Recommended OTC cough suppressant as needed      Follow up in 3 months for flu vaccine

## 2021-02-20 ENCOUNTER — OFFICE VISIT (OUTPATIENT)
Dept: URGENT CARE | Age: 26
End: 2021-02-20
Payer: COMMERCIAL

## 2021-02-20 VITALS
WEIGHT: 215 LBS | DIASTOLIC BLOOD PRESSURE: 67 MMHG | HEIGHT: 73 IN | HEART RATE: 92 BPM | OXYGEN SATURATION: 99 % | RESPIRATION RATE: 16 BRPM | TEMPERATURE: 97.9 F | BODY MASS INDEX: 28.49 KG/M2 | SYSTOLIC BLOOD PRESSURE: 133 MMHG

## 2021-02-20 DIAGNOSIS — F50.00 ANOREXIA NERVOSA: Primary | ICD-10-CM

## 2021-02-20 PROCEDURE — 99203 OFFICE O/P NEW LOW 30 MIN: CPT | Performed by: PREVENTIVE MEDICINE

## 2021-02-20 NOTE — PATIENT INSTRUCTIONS
Anorexia Nervosa   AMBULATORY CARE:   Anorexia nervosa  is an eating disorder that can lead to severe weight loss  Anorexia may cause you to stop eating or to eat fewer calories than your body needs  The weight loss is not related to another medical condition  Common signs and symptoms of anorexia nervosa:   · Fear of gaining weight, even if you are very thin    · Spending much of your time thinking about food and how to lose more weight    · Body weight that is much lower than is healthy for your height and age    · Restricting food, measuring or weighing everything you eat, or not eating at all    · Exercising too much to prevent weight gain    · Feeling tired, weak, and cold much of the time    · Cracked or dry skin, thinning hair, or fine hair covering your body    · Stomach pain or an upset stomach, or constipation    · Mood control problems, such as easily becoming angry, or depression    Call 911 for any of the following:   · You want to harm or kill yourself  · You have pain when you swallow, or severe pain in your chest or abdomen  · Your heart is beating fast or fluttering, or you feel dizzy or faint  Seek care immediately if:   · Your muscles feel weak, and you have pain and stiffness  Contact your healthcare provider if:   · You have tingling in your hands or feet  · Your monthly period is light or has stopped completely (females)  · You are planning to get pregnant and need to develop a safe eating plan  · You have questions or concerns about your condition or care  Treatment:  You may feel like it will be hard to get better  You may have a lot of feelings about eating and reaching a healthy body weight  Treatment is meant to help you develop a healthy relationship with food  Treatment may also be needed for health problems caused by anorexia  Treatment may need to take place in a hospital or clinic  · Counseling  is an important part of treatment   You may work with healthcare providers alone or in a group  Group counseling is a way for you to talk with others who have anorexia  Counseling may center on helping you replace negative thoughts with positive thoughts  Family sessions can help everyone in the family understand anorexia and what to do to help you  · Nutrition therapy  means you will meet with a dietitian to plan healthy meals  Others in your family may also meet with the dietitian  Your healthcare providers and dietitian will work with you to make small changes over time  · Medicines  are sometimes used to help treat anorexia or the health problems it causes  You may get medicine to help improve your mood, control mood swings, and decrease obsessive thoughts  Vitamin or mineral supplements may also be needed if your nutrient levels are low because of anorexia  What can I do to help myself? · Be patient  Recovery from anorexia is a process that takes time  You may have times when you go back to not eating, or eating few calories, especially during stressful times  This is common  Work with family members and healthcare providers to get back on track with healthy eating and healthy exercise  Try not to be angry with yourself for the episode  It might help to talk about your feelings with someone you trust      · Focus on a healthy self-esteem  Think about everything you like about yourself  For example, you may be a talented artist, or you may write well  Focus on those skills or talents instead of on appearance  Ask others not to comment on your weight or shape  Your healthcare provider can tell you healthy weight ranges for your age and height  It may take time before you are comfortable knowing your weight or seeing your weight as healthy  Remember your goals to build a healthy self-esteem  Be patient with yourself as you change your thinking      For support and more information:   · National Eating Disorders Association  [address] [place] , [address] [facility] [address]  Phone: 6- 700 - 517-7063  Phone: 3- 379 - [phone]  Web Address: http://www  [employer]  org    · [address] [facility], Office of [employer]  [address] [facility], [address], [address]  [place], [place] [address]   Phone: 3- [phone]  Phone: 5- 539 - [phone]  Web Address: [facility]  Follow up with your healthcare provider as directed: You may need blood tests once you start taking medicine for anorexia  These tests are used to check how much medicine is in your blood  Your healthcare provider will use the results of these tests to decide the right amount of medicine for you  You may need to have these blood tests more than once  Write down your questions so you remember to ask them during your visits  © Copyright [address] Information is for End User's use only and may not be sold, redistributed or otherwise used for commercial purposes  All illustrations and images included in CareNotes® are the copyrighted property of [employer]  or [address] [place] [address]  The above information is an  only  It is not intended as medical advice for individual conditions or treatments  Talk to your doctor, nurse or pharmacist before following any medical regimen to see if it is safe and effective for you

## 2021-03-01 ENCOUNTER — OFFICE VISIT (OUTPATIENT)
Dept: FAMILY MEDICINE CLINIC | Facility: CLINIC | Age: 26
End: 2021-03-01
Payer: COMMERCIAL

## 2021-03-01 VITALS
WEIGHT: 215 LBS | DIASTOLIC BLOOD PRESSURE: 70 MMHG | OXYGEN SATURATION: 97 % | HEART RATE: 119 BPM | TEMPERATURE: 98.6 F | BODY MASS INDEX: 28.49 KG/M2 | SYSTOLIC BLOOD PRESSURE: 120 MMHG | HEIGHT: 73 IN

## 2021-03-01 DIAGNOSIS — F32.4 MAJOR DEPRESSIVE DISORDER WITH SINGLE EPISODE, IN PARTIAL REMISSION (HCC): Primary | ICD-10-CM

## 2021-03-01 PROBLEM — R05.8 POST-VIRAL COUGH SYNDROME: Status: RESOLVED | Noted: 2019-08-21 | Resolved: 2021-03-01

## 2021-03-01 PROCEDURE — 3725F SCREEN DEPRESSION PERFORMED: CPT | Performed by: FAMILY MEDICINE

## 2021-03-01 PROCEDURE — 99214 OFFICE O/P EST MOD 30 MIN: CPT | Performed by: FAMILY MEDICINE

## 2021-03-01 RX ORDER — TRAZODONE HYDROCHLORIDE 50 MG/1
50 TABLET ORAL
COMMUNITY
Start: 2021-02-25 | End: 2021-05-10 | Stop reason: SDUPTHER

## 2021-03-01 NOTE — ASSESSMENT & PLAN NOTE
Improving slowly  Question whether patient is truly intolerant of sertraline versus just sensitive to initial start at 50 mg   Recommended 7 days of 25 mg and then increased to 50 mg  Recommended trazodone 50 mg as prescribed  Patient is scheduled to start therapy tonight  Call precautions discussed

## 2021-03-01 NOTE — PATIENT INSTRUCTIONS
Try to take 25 mg (1/2 tablet) of the Zoloft for a week and then increase to 50 mg  I would also recommend trying the Trazodone as it can help with your sleep

## 2021-03-01 NOTE — PROGRESS NOTES
BMI Counseling: Body mass index is 28 37 kg/m²  The BMI is above normal  Nutrition recommendations include consuming healthier snacks  Exercise recommendations include exercising 3-5 times per week  No pharmacotherapy was ordered  Depression Screening and Follow-up Plan: Patient's depression screening was positive with a PHQ-2 score of 4  Their PHQ-9 score was 21  Patient assessed for underlying major depression  Brief counseling provided and recommend additional follow-up/re-evaluation next office visit  Continue regular follow-up with their mental health provider who is managing their mental health condition(s)  Family Medicine Follow-Up Office Visit  Sandra Hernandez 23 y o  male   MRN: [id number] : 1995  ENCOUNTER: 3/1/2021 2:59 PM    Assessment and Plan   Major depressive disorder with single episode, in partial remission ([facility] [address] )  Improving slowly  Question whether patient is truly intolerant of sertraline versus just sensitive to initial start at 50 mg   Recommended 7 days of 25 mg and then increased to 50 mg  Recommended trazodone 50 mg as prescribed  Patient is scheduled to start therapy tonight  Call precautions discussed  RTC 1 mo    Chief Complaint     Chief Complaint   Patient presents with    Follow-up       History of Present Illness   Sandra Hernandez is a 23y o -year-old male who presents today for follow-up of recent hospitalization for suicidal thoughts  This hospitalization occurred at the end of February at [facility] in [place]  Patient notes that his mood has been progressively worsening over the last few weeks  In the two weeks leading up to his hospitalization he was having thoughts of harming himself and that he would be better off dead  No active thoughts of suicide  He was self-harming by punching himself in the forehead and hitting his head on the doorframe   In the week leading up to the hospitalization his anxiety significantly worsened  He was having panic attacks at least one per hour  His anxiety was so severe that he also had anxiety-related anorexia  He did not eat for 4 days and started to feel very weak  His mom was concerned about him not eating and his mood worsening and brought him to the ED  He stayed in the ED for a total of 3 days waiting for a bed to open up for inpatient psych  During his stay in the ED he was given Ativan which helped significantly for his anxiety  He then was transferred to [facility] in [place] and was admitted 2/23 - 2/26  During hospitalization he was started on 50 mg of Trazodone and 50 mg of Zoloft  His appetite did improve, however, he did notice significant anxiety as he started taking Zoloft  He has been on Zoloft at 50 mg at the past and tolerated it well  His father has medication sensitivities  He has taken 1/2 tablet of the 50 mg of Zoloft for the last two days and nausea has been slightly better  He has not taken the Trazodone since being discharged  He continues to have panic attacks  Had one last night that lasted 1 hour  Also had one this morning that awoke him from sleep  Horacio Silva has previously been in therapy for his mood but stopped in December as he was doing well  He is scheduled to start therapy again christian Silva also reports that he had a temperature of 100 3F on Saturday evening  No chills, fatigue, abdominal pain, SOB, cough, congestion, changes in taste or smell  Review of Systems   Review of Systems   Constitutional: Negative for activity change, chills, fatigue and fever  HENT: Negative for congestion, sinus pressure, sinus pain and sore throat  Respiratory: Negative for cough, shortness of breath and wheezing  Cardiovascular: Negative for chest pain, palpitations and leg swelling  Gastrointestinal: Negative for abdominal pain, diarrhea, nausea and vomiting  Genitourinary: Negative for decreased urine volume, dysuria, frequency and urgency  Musculoskeletal: Negative for arthralgias, myalgias, neck pain and neck stiffness  Skin: Negative for rash  Neurological: Negative for dizziness, light-headedness, numbness and headaches  Psychiatric/Behavioral: Positive for dysphoric mood and sleep disturbance  Negative for suicidal ideas  The patient is nervous/anxious  Active Problem List     Patient Active Problem List   Diagnosis    Other fatigue    Depressed mood    Screening for lipoid disorders    Major depressive disorder with single episode, in partial remission ([facility] [address] )    Healthcare maintenance       Past Medical History, Past Surgical History, Family History, and Social History were reviewed and updated today as appropriate  Objective   /70   Pulse (!) 119   Temp 98 6 °F (37 °C)   Ht 6' 1" (1 854 m)   Wt 97 5 kg (215 lb)   SpO2 97%   BMI 28 37 kg/m²     Physical Exam  Constitutional:       General: He is not in acute distress  Appearance: He is well-developed  HENT:      Head: Normocephalic and atraumatic  Eyes:      Pupils: Pupils are equal, round, and reactive to light  Neck:      Musculoskeletal: Normal range of motion and neck supple  Cardiovascular:      Rate and Rhythm: Normal rate and regular rhythm  Heart sounds: Normal heart sounds  No murmur  No friction rub  No gallop  Pulmonary:      Effort: Pulmonary effort is normal  No respiratory distress  Breath sounds: Normal breath sounds  No wheezing or rales  Abdominal:      Palpations: Abdomen is soft  Tenderness: There is no abdominal tenderness  Musculoskeletal: Normal range of motion  Neurological:      Mental Status: He is alert and oriented to person, place, and time  Psychiatric:         Thought Content:  Thought content normal        Diabetic Foot Exam    Pertinent Laboratory/Diagnostic Studies:  Lab Results   Component Value Date    BUN 10 05/21/2018    CREATININE 0 98 05/21/2018    CALCIUM 9 7 05/21/2018    K 4 5 05/21/2018    CO2 28 05/21/2018     05/21/2018     Lab Results   Component Value Date    ALT 29 05/21/2018    AST 22 05/21/2018    ALKPHOS 125 (H) 05/21/2018       Lab Results   Component Value Date    WBC 7 1 05/21/2018    HGB 15 9 05/21/2018    HCT 46 2 05/21/2018    MCV 84 5 05/21/2018     05/21/2018       No results found for: TSH    No results found for: CHOL  Lab Results   Component Value Date    TRIG 149 05/21/2018     Lab Results   Component Value Date    HDL 35 (L) 05/21/2018     Lab Results   Component Value Date    LDLCALC 85 05/21/2018     No results found for: HGBA1C    Results for orders placed or performed in visit on 05/21/18   Lipid Panel with Direct LDL reflex   Result Value Ref Range    Total Cholesterol 145 <200 mg/dL    HDL 35 (L) >40 mg/dL    Triglycerides 149 <150 mg/dL    LDL Calculated 85 mg/dL (calc)    Chol HDLC Ratio 4 1 <5 0 (calc)    Non-HDL Cholesterol 110 <130 mg/dL (calc)   Comprehensive metabolic panel   Result Value Ref Range    Glucose, Random 89 65 - 99 mg/dL    BUN 10 7 - 25 mg/dL    Creatinine 0 98 0 60 - 1 35 mg/dL    eGFR Non  111 > OR = 60 mL/min/1 73m2    eGFR  128 > OR = 60 mL/min/1 73m2    SL AMB BUN/CREATININE RATIO NOT APPLICABLE 6 - 22 (calc)    Sodium 139 135 - 146 mmol/L    Potassium 4 5 3 5 - 5 3 mmol/L    Chloride 103 98 - 110 mmol/L    CO2 28 20 - 31 mmol/L    Calcium 9 7 8 6 - 10 3 mg/dL    Protein, Total 6 7 6 1 - 8 1 g/dL    Albumin 4 3 3 6 - 5 1 g/dL    Globulin 2 4 1 9 - 3 7 g/dL (calc)    Albumin/Globulin Ratio 1 8 1 0 - 2 5 (calc)    TOTAL BILIRUBIN 0 4 0 2 - 1 2 mg/dL    Alkaline Phosphatase 125 (H) 40 - 115 U/L    AST 22 10 - 40 U/L    ALT 29 9 - 46 U/L   CBC and differential   Result Value Ref Range    White Blood Cell Count 7 1 3 8 - 10 8 Thousand/uL    Red Blood Cell Count 5 47 4 20 - 5 80 Million/uL    Hemoglobin 15 9 13 2 - 17 1 g/dL    HCT 46 2 38 5 - 50 0 %    MCV 84 5 80 0 - 100 0 fL    MCH 29 1 27 0 - 33 0 pg    MCHC 34 4 32 0 - 36 0 g/dL    RDW 12 1 11 0 - 15 0 %    Platelet Count 187 854 - 400 Thousand/uL    SL AMB MPV 10 4 7 5 - 12 5 fL    Neutrophils (Absolute) 3,834 1,500 - 7,800 cells/uL    Lymphocytes (Absolute) 2,499 850 - 3,900 cells/uL    Monocytes (Absolute) 575 200 - 950 cells/uL    Eosinophils (Absolute) 114 15 - 500 cells/uL    Basophils ABS 78 0 - 200 cells/uL    Neutrophils 54 %    Lymphocytes 35 2 %    Monocytes 8 1 %    Eosinophils 1 6 %    Basophils PCT 1 1 %   TSH, 3rd generation with T4 reflex   Result Value Ref Range    TSH W/RFX TO FREE T4 1 92 0 40 - 4 50 mIU/L       No orders of the defined types were placed in this encounter  Current Medications     Current Outpatient Medications   Medication Sig Dispense Refill    sertraline (ZOLOFT) 50 mg tablet Take 50 mg by mouth      traZODone (DESYREL) 50 mg tablet Take 50 mg by mouth Patient has medication but has not been taking it   albuterol (PROVENTIL HFA) 90 mcg/act inhaler Inhale 2 puffs every 6 (six) hours as needed       No current facility-administered medications for this visit          ALLERGIES:  Allergies   Allergen Reactions    Kiwi Extract Anaphylaxis     Throat swelling, difficulty breathing, tongue swelling    Pineapple Anaphylaxis     Throat swelling, difficulty breathing, tongue swelling       Health Maintenance     Health Maintenance   Topic Date Due    HIV Screening  07/13/2012    BMI: Followup Plan  07/13/2015    Annual Physical  07/13/2015    Depression Remission PHQ  07/26/2019    DTaP,Tdap,and Td Vaccines (7 - Td) 07/21/2020    Influenza Vaccine (1) 09/01/2020    BMI: Adult  03/01/2022    HIB Vaccine  Completed    Hepatitis B Vaccine  Completed    IPV Vaccine  Completed    Meningococcal ACWY Vaccine  Completed    HPV Vaccine  Completed    Pneumococcal Vaccine: Pediatrics (0 to 5 Years) and At-Risk Patients (6 to 59 Years)  Aged Out    Hepatitis A Vaccine  Aged [employer] History Administered Date(s) Administered    DTaP 5 1997, 1997, 02/16/1998, 02/11/1999, 05/01/2003    HPV Quadrivalent 08/08/2013, 09/17/2013, 02/11/2014    Hep B, Adolescent or Pediatric 1997, 1997, 05/14/1998    Hib (PRP-T) 1997, 1997, 02/11/1999    INFLUENZA 09/21/2012, 09/30/2013    IPV 1997, 1997, 02/11/1999, 07/06/2004    MMR 11/12/1998, 05/01/2003    Meningococcal MCV4P 07/04/2010, 08/08/2013    Meningococcal, Unknown Serogroups 07/21/2010, 08/08/2013    Tdap 07/21/2010    Varicella 08/02/1999, 08/13/2009         Emre Rodriguez MD   [address]  3/1/2021  2:59 PM    Parts of this note were dictated using [employer] dictation software and may have sounds-like errors due to variation in pronunciation

## 2021-03-26 PROBLEM — Z13.220 SCREENING FOR LIPOID DISORDERS: Status: RESOLVED | Noted: 2018-05-16 | Resolved: 2021-03-26

## 2021-03-26 NOTE — PROGRESS NOTES
Family Medicine Follow-Up Office Visit  Yann Wilson 23 y o  male   MRN: [id number] : 1995  ENCOUNTER: 3/29/2021 1:40 PM    Assessment and Plan   No problem-specific Assessment & Plan notes found for this encounter  Chief Complaint     Chief Complaint   Patient presents with    Follow-up       History of Present Illness   Yann Wilson is a 23y o -year-old male who presents today for followup of MDD with recent medication adjustment  Currently taking 50mg of sertraline and trazodone 50mg,     Mood has improved, fewer ups/downs, more up than down  Notes improvement in sleep, good nights and bad nights  No SI/HI  Eating more, having fewer episodes of racing heart from anxiety  Noting less anxiety prior to academic presentations  Having a slow return of stamina and conditioning, which he feels decreased during his period of being too depressed to do things  Engaged in his [address] "troop," deriving much more satisfaction than during his deeper depression  Not noting any SE from medication  Working with therapist consistently  Review of Systems   Review of Systems   Constitutional: Negative for activity change, chills, fatigue and fever  HENT: Negative for congestion, sinus pressure, sinus pain and sore throat  Respiratory: Negative for cough, shortness of breath and wheezing  Cardiovascular: Negative for chest pain, palpitations and leg swelling  Gastrointestinal: Negative for abdominal pain, diarrhea, nausea and vomiting  Genitourinary: Negative for decreased urine volume, dysuria, frequency and urgency  Musculoskeletal: Negative for arthralgias, myalgias, neck pain and neck stiffness  Skin: Negative for rash  Neurological: Negative for dizziness, light-headedness, numbness and headaches         Active Problem List     Patient Active Problem List   Diagnosis    Other fatigue    Depressed mood    Major depressive disorder with single episode, in partial remission ([facility] [address] )  Healthcare maintenance       Past Medical History, Past Surgical History, Family History, and Social History were reviewed and updated today as appropriate  Objective   /80   Pulse 88   Temp 98 4 °F (36 9 °C)   Ht 6' 1" (1 854 m)   Wt 98 9 kg (218 lb)   SpO2 98%   BMI 28 76 kg/m²     Physical Exam  Constitutional:       General: He is not in acute distress  Appearance: He is well-developed  HENT:      Head: Normocephalic and atraumatic  Eyes:      Pupils: Pupils are equal, round, and reactive to light  Neck:      Musculoskeletal: Normal range of motion and neck supple  Cardiovascular:      Rate and Rhythm: Normal rate and regular rhythm  Heart sounds: Normal heart sounds  No murmur  No friction rub  No gallop  Pulmonary:      Effort: Pulmonary effort is normal  No respiratory distress  Breath sounds: Normal breath sounds  No wheezing or rales  Abdominal:      Palpations: Abdomen is soft  Tenderness: There is no abdominal tenderness  Musculoskeletal: Normal range of motion  Neurological:      Mental Status: He is alert and oriented to person, place, and time  Psychiatric:         Thought Content:  Thought content normal        Diabetic Foot Exam    Pertinent Laboratory/Diagnostic Studies:  Lab Results   Component Value Date    BUN 10 05/21/2018    CREATININE 0 98 05/21/2018    CALCIUM 9 7 05/21/2018    K 4 5 05/21/2018    CO2 28 05/21/2018     05/21/2018     Lab Results   Component Value Date    ALT 29 05/21/2018    AST 22 05/21/2018    ALKPHOS 125 (H) 05/21/2018       Lab Results   Component Value Date    WBC 7 1 05/21/2018    HGB 15 9 05/21/2018    HCT 46 2 05/21/2018    MCV 84 5 05/21/2018     05/21/2018       No results found for: TSH    No results found for: CHOL  Lab Results   Component Value Date    TRIG 149 05/21/2018     Lab Results   Component Value Date    HDL 35 (L) 05/21/2018     Lab Results   Component Value Date    LDLCALC 85 05/21/2018     No results found for: HGBA1C    Results for orders placed or performed in visit on 05/21/18   Lipid Panel with Direct LDL reflex   Result Value Ref Range    Total Cholesterol 145 <200 mg/dL    HDL 35 (L) >40 mg/dL    Triglycerides 149 <150 mg/dL    LDL Calculated 85 mg/dL (calc)    Chol HDLC Ratio 4 1 <5 0 (calc)    Non-HDL Cholesterol 110 <130 mg/dL (calc)   Comprehensive metabolic panel   Result Value Ref Range    Glucose, Random 89 65 - 99 mg/dL    BUN 10 7 - 25 mg/dL    Creatinine 0 98 0 60 - 1 35 mg/dL    eGFR Non  111 > OR = 60 mL/min/1 73m2    eGFR  128 > OR = 60 mL/min/1 73m2    SL AMB BUN/CREATININE RATIO NOT APPLICABLE 6 - 22 (calc)    Sodium 139 135 - 146 mmol/L    Potassium 4 5 3 5 - 5 3 mmol/L    Chloride 103 98 - 110 mmol/L    CO2 28 20 - 31 mmol/L    Calcium 9 7 8 6 - 10 3 mg/dL    Protein, Total 6 7 6 1 - 8 1 g/dL    Albumin 4 3 3 6 - 5 1 g/dL    Globulin 2 4 1 9 - 3 7 g/dL (calc)    Albumin/Globulin Ratio 1 8 1 0 - 2 5 (calc)    TOTAL BILIRUBIN 0 4 0 2 - 1 2 mg/dL    Alkaline Phosphatase 125 (H) 40 - 115 U/L    AST 22 10 - 40 U/L    ALT 29 9 - 46 U/L   CBC and differential   Result Value Ref Range    White Blood Cell Count 7 1 3 8 - 10 8 Thousand/uL    Red Blood Cell Count 5 47 4 20 - 5 80 Million/uL    Hemoglobin 15 9 13 2 - 17 1 g/dL    HCT 46 2 38 5 - 50 0 %    MCV 84 5 80 0 - 100 0 fL    MCH 29 1 27 0 - 33 0 pg    MCHC 34 4 32 0 - 36 0 g/dL    RDW 12 1 11 0 - 15 0 %    Platelet Count 892 445 - 400 Thousand/uL    SL AMB MPV 10 4 7 5 - 12 5 fL    Neutrophils (Absolute) 3,834 1,500 - 7,800 cells/uL    Lymphocytes (Absolute) 2,499 850 - 3,900 cells/uL    Monocytes (Absolute) 575 200 - 950 cells/uL    Eosinophils (Absolute) 114 15 - 500 cells/uL    Basophils ABS 78 0 - 200 cells/uL    Neutrophils 54 %    Lymphocytes 35 2 %    Monocytes 8 1 %    Eosinophils 1 6 %    Basophils PCT 1 1 %   TSH, 3rd generation with T4 reflex   Result Value Ref Range    TSH W/RFX TO FREE T4 1 92 0 40 - 4 50 mIU/L       No orders of the defined types were placed in this encounter  Current Medications     Current Outpatient Medications   Medication Sig Dispense Refill    sertraline (ZOLOFT) 50 mg tablet Take 50 mg by mouth      traZODone (DESYREL) 50 mg tablet Take 50 mg by mouth Patient has medication but has not been taking it   albuterol (PROVENTIL HFA) 90 mcg/act inhaler Inhale 2 puffs every 6 (six) hours as needed       No current facility-administered medications for this visit          ALLERGIES:  Allergies   Allergen Reactions    Kiwi Extract Anaphylaxis     Throat swelling, difficulty breathing, tongue swelling    Pineapple Anaphylaxis     Throat swelling, difficulty breathing, tongue swelling       Health Maintenance     Health Maintenance   Topic Date Due    HIV Screening  Never done    Annual Physical  Never done    DTaP,Tdap,and Td Vaccines (7 - Td) 07/21/2020    Influenza Vaccine (1) 09/01/2020    BMI: Followup Plan  03/01/2022    BMI: Adult  03/01/2022    Depression Remission PHQ  03/01/2022    HIB Vaccine  Completed    Hepatitis B Vaccine  Completed    IPV Vaccine  Completed    Meningococcal ACWY Vaccine  Completed    HPV Vaccine  Completed    Pneumococcal Vaccine: Pediatrics (0 to 5 Years) and At-Risk Patients (6 to 59 Years)  Aged Out    Hepatitis A Vaccine  Aged [employer] History   Administered Date(s) Administered    DTaP 5 1997, 1997, 02/16/1998, 02/11/1999, 05/01/2003    HPV Quadrivalent 08/08/2013, 09/17/2013, 02/11/2014    Hep B, Adolescent or Pediatric 1997, 1997, 05/14/1998    Hib (PRP-T) 1997, 1997, 02/11/1999    INFLUENZA 09/21/2012, 09/30/2013    IPV 1997, 1997, 02/11/1999, 07/06/2004    MMR 11/12/1998, 05/01/2003    Meningococcal MCV4P 07/04/2010, 08/08/2013    Meningococcal, Unknown Serogroups 07/21/2010, 08/08/2013    Tdap 07/21/2010    Varicella 08/02/1999, 08/13/2009         Za Butcher MD   [address]  3/29/2021  1:40 PM    Parts of this note were dictated using M*Modal dictation software and may have sounds-like errors due to variation in pronunciation

## 2021-03-29 ENCOUNTER — OFFICE VISIT (OUTPATIENT)
Dept: FAMILY MEDICINE CLINIC | Facility: CLINIC | Age: 26
End: 2021-03-29
Payer: COMMERCIAL

## 2021-03-29 VITALS
BODY MASS INDEX: 28.89 KG/M2 | OXYGEN SATURATION: 98 % | HEIGHT: 73 IN | WEIGHT: 218 LBS | SYSTOLIC BLOOD PRESSURE: 118 MMHG | HEART RATE: 88 BPM | DIASTOLIC BLOOD PRESSURE: 80 MMHG | TEMPERATURE: 98.4 F

## 2021-03-29 DIAGNOSIS — F32.4 MAJOR DEPRESSIVE DISORDER WITH SINGLE EPISODE, IN PARTIAL REMISSION (HCC): Primary | ICD-10-CM

## 2021-03-29 PROCEDURE — 3008F BODY MASS INDEX DOCD: CPT | Performed by: FAMILY MEDICINE

## 2021-03-29 PROCEDURE — 99214 OFFICE O/P EST MOD 30 MIN: CPT | Performed by: FAMILY MEDICINE

## 2021-03-29 PROCEDURE — 1036F TOBACCO NON-USER: CPT | Performed by: FAMILY MEDICINE

## 2021-03-29 NOTE — ASSESSMENT & PLAN NOTE
Excellent improvement with sertraline and trazodone  Also doing very well with counseling  Continue current regimen, counseled patient on reasons to call me  We will follow up in 6 weeks

## 2021-05-10 ENCOUNTER — OFFICE VISIT (OUTPATIENT)
Dept: FAMILY MEDICINE CLINIC | Facility: CLINIC | Age: 26
End: 2021-05-10
Payer: COMMERCIAL

## 2021-05-10 VITALS
TEMPERATURE: 98.3 F | BODY MASS INDEX: 29.82 KG/M2 | HEART RATE: 88 BPM | OXYGEN SATURATION: 97 % | WEIGHT: 226 LBS | DIASTOLIC BLOOD PRESSURE: 74 MMHG | RESPIRATION RATE: 20 BRPM | SYSTOLIC BLOOD PRESSURE: 122 MMHG

## 2021-05-10 DIAGNOSIS — F32.4 MAJOR DEPRESSIVE DISORDER WITH SINGLE EPISODE, IN PARTIAL REMISSION (HCC): Primary | ICD-10-CM

## 2021-05-10 PROBLEM — R45.89 DEPRESSED MOOD: Status: RESOLVED | Noted: 2018-05-16 | Resolved: 2021-05-10

## 2021-05-10 PROBLEM — Z00.00 HEALTHCARE MAINTENANCE: Status: RESOLVED | Noted: 2019-08-02 | Resolved: 2021-05-10

## 2021-05-10 PROCEDURE — 99214 OFFICE O/P EST MOD 30 MIN: CPT | Performed by: FAMILY MEDICINE

## 2021-05-10 RX ORDER — TRAZODONE HYDROCHLORIDE 50 MG/1
25 TABLET ORAL
Qty: 45 TABLET | Refills: 1 | Status: SHIPPED | OUTPATIENT
Start: 2021-05-10 | End: 2021-09-14 | Stop reason: ALTCHOICE

## 2021-05-10 NOTE — PROGRESS NOTES
Family Medicine Follow-Up Office Visit  Kelly Lewis 23 y o  male   MRN: [id number] : 1995  ENCOUNTER: 5/10/2021 1:30 PM    Assessment and Plan   No problem-specific Assessment & Plan notes found for this encounter  Chief Complaint     Chief Complaint   Patient presents with    Follow-up     sertraline       History of Present Illness   Kelly Lewis is a 23y o -year-old male who presents today for followup of MDD  At our last visit, John Geiger was doing much better with sertraline and trazodone  Review of Systems   Review of Systems   Constitutional: Negative for activity change, chills, fatigue and fever  HENT: Negative for congestion, sinus pressure, sinus pain and sore throat  Respiratory: Negative for cough, shortness of breath and wheezing  Cardiovascular: Negative for chest pain, palpitations and leg swelling  Gastrointestinal: Negative for abdominal pain, diarrhea, nausea and vomiting  Genitourinary: Negative for decreased urine volume, dysuria, frequency and urgency  Musculoskeletal: Negative for arthralgias, myalgias, neck pain and neck stiffness  Skin: Negative for rash  Neurological: Negative for dizziness, light-headedness, numbness and headaches  Active Problem List     Patient Active Problem List   Diagnosis    Other fatigue    Major depressive disorder with single episode, in partial remission [facility])       Past Medical History, Past Surgical History, Family History, and Social History were reviewed and updated today as appropriate  Objective   /74 (BP Location: Left arm, Patient Position: Sitting, Cuff Size: Large)   Pulse 88   Temp 98 3 °F (36 8 °C)   Resp 20   Wt 103 kg (226 lb)   SpO2 97%   BMI 29 82 kg/m²     Physical Exam  Constitutional:       General: He is not in acute distress  Appearance: He is well-developed  HENT:      Head: Normocephalic and atraumatic  Eyes:      Pupils: Pupils are equal, round, and reactive to light     Neck: Musculoskeletal: Normal range of motion and neck supple  Cardiovascular:      Rate and Rhythm: Normal rate and regular rhythm  Heart sounds: Normal heart sounds  No murmur  No friction rub  No gallop  Pulmonary:      Effort: Pulmonary effort is normal  No respiratory distress  Breath sounds: Normal breath sounds  No wheezing or rales  Abdominal:      Palpations: Abdomen is soft  Tenderness: There is no abdominal tenderness  Musculoskeletal: Normal range of motion  Neurological:      Mental Status: He is alert and oriented to person, place, and time  Psychiatric:         Thought Content:  Thought content normal        Diabetic Foot Exam    Pertinent Laboratory/Diagnostic Studies:  Lab Results   Component Value Date    BUN 10 05/21/2018    CREATININE 0 98 05/21/2018    CALCIUM 9 7 05/21/2018    K 4 5 05/21/2018    CO2 28 05/21/2018     05/21/2018     Lab Results   Component Value Date    ALT 29 05/21/2018    AST 22 05/21/2018    ALKPHOS 125 (H) 05/21/2018       Lab Results   Component Value Date    WBC 7 1 05/21/2018    HGB 15 9 05/21/2018    HCT 46 2 05/21/2018    MCV 84 5 05/21/2018     05/21/2018       No results found for: TSH    No results found for: CHOL  Lab Results   Component Value Date    TRIG 149 05/21/2018     Lab Results   Component Value Date    HDL 35 (L) 05/21/2018     Lab Results   Component Value Date    LDLCALC 85 05/21/2018     No results found for: HGBA1C    Results for orders placed or performed in visit on 05/21/18   Lipid Panel with Direct LDL reflex   Result Value Ref Range    Total Cholesterol 145 <200 mg/dL    HDL 35 (L) >40 mg/dL    Triglycerides 149 <150 mg/dL    LDL Calculated 85 mg/dL (calc)    Chol HDLC Ratio 4 1 <5 0 (calc)    Non-HDL Cholesterol 110 <130 mg/dL (calc)   Comprehensive metabolic panel   Result Value Ref Range    Glucose, Random 89 65 - 99 mg/dL    BUN 10 7 - 25 mg/dL    Creatinine 0 98 0 60 - 1 35 mg/dL    eGFR Non  American 111 > OR = 60 mL/min/1 73m2    eGFR  128 > OR = 60 mL/min/1 73m2    SL AMB BUN/CREATININE RATIO NOT APPLICABLE 6 - 22 (calc)    Sodium 139 135 - 146 mmol/L    Potassium 4 5 3 5 - 5 3 mmol/L    Chloride 103 98 - 110 mmol/L    CO2 28 20 - 31 mmol/L    Calcium 9 7 8 6 - 10 3 mg/dL    Protein, Total 6 7 6 1 - 8 1 g/dL    Albumin 4 3 3 6 - 5 1 g/dL    Globulin 2 4 1 9 - 3 7 g/dL (calc)    Albumin/Globulin Ratio 1 8 1 0 - 2 5 (calc)    TOTAL BILIRUBIN 0 4 0 2 - 1 2 mg/dL    Alkaline Phosphatase 125 (H) 40 - 115 U/L    AST 22 10 - 40 U/L    ALT 29 9 - 46 U/L   CBC and differential   Result Value Ref Range    White Blood Cell Count 7 1 3 8 - 10 8 Thousand/uL    Red Blood Cell Count 5 47 4 20 - 5 80 Million/uL    Hemoglobin 15 9 13 2 - 17 1 g/dL    HCT 46 2 38 5 - 50 0 %    MCV 84 5 80 0 - 100 0 fL    MCH 29 1 27 0 - 33 0 pg    MCHC 34 4 32 0 - 36 0 g/dL    RDW 12 1 11 0 - 15 0 %    Platelet Count 885 675 - 400 Thousand/uL    SL AMB MPV 10 4 7 5 - 12 5 fL    Neutrophils (Absolute) 3,834 1,500 - 7,800 cells/uL    Lymphocytes (Absolute) 2,499 850 - 3,900 cells/uL    Monocytes (Absolute) 575 200 - 950 cells/uL    Eosinophils (Absolute) 114 15 - 500 cells/uL    Basophils ABS 78 0 - 200 cells/uL    Neutrophils 54 %    Lymphocytes 35 2 %    Monocytes 8 1 %    Eosinophils 1 6 %    Basophils PCT 1 1 %   TSH, 3rd generation with T4 reflex   Result Value Ref Range    TSH W/RFX TO FREE T4 1 92 0 40 - 4 50 mIU/L       No orders of the defined types were placed in this encounter  Current Medications     Current Outpatient Medications   Medication Sig Dispense Refill    traZODone (DESYREL) 50 mg tablet Take 0 5 tablets (25 mg total) by mouth daily at bedtime 45 tablet 1     No current facility-administered medications for this visit          ALLERGIES:  Allergies   Allergen Reactions    Kiwi Extract - Food Allergy Anaphylaxis     Throat swelling, difficulty breathing, tongue swelling    Pineapple - Food Allergy Anaphylaxis     Throat swelling, difficulty breathing, tongue swelling       Health Maintenance     Health Maintenance   Topic Date Due    HIV Screening  Never done    COVID-19 Vaccine (1) Never done    Annual Physical  Never done    DTaP,Tdap,and Td Vaccines (7 - Td) 07/21/2020    Influenza Vaccine (Season Ended) 09/01/2021    BMI: Followup Plan  03/01/2022    Depression Remission PHQ  03/01/2022    BMI: Adult  03/29/2022    HIB Vaccine  Completed    Hepatitis B Vaccine  Completed    IPV Vaccine  Completed    Meningococcal ACWY Vaccine  Completed    HPV Vaccine  Completed    Pneumococcal Vaccine: Pediatrics (0 to 5 Years) and At-Risk Patients (6 to 59 Years)  Aged Out    Hepatitis A Vaccine  Aged [employer] History   Administered Date(s) Administered    DTaP 5 1997, 1997, 02/16/1998, 02/11/1999, 05/01/2003    HPV Quadrivalent 08/08/2013, 09/17/2013, 02/11/2014    Hep B, Adolescent or Pediatric 1997, 1997, 05/14/1998    Hib (PRP-T) 1997, 1997, 02/11/1999    INFLUENZA 09/21/2012, 09/30/2013    IPV 1997, 1997, 02/11/1999, 07/06/2004    MMR 11/12/1998, 05/01/2003    Meningococcal MCV4P 07/04/2010, 08/08/2013    Meningococcal, Unknown Serogroups 07/21/2010, 08/08/2013    Tdap 07/21/2010    Varicella 08/02/1999, 08/13/2009         Harleen Alcazar MD   [address]  5/10/2021  1:30 PM    Parts of this note were dictated using M*Modal dictation software and may have sounds-like errors due to variation in pronunciation

## 2021-05-10 NOTE — PROGRESS NOTES
Assessment/Plan:    Mr Smith Winslow is a 23year old male presenting for follow-up for mood  He admits to not taking trazodone medication  Patient is agreeable to taking 25mg trazodone today  Follow-up in 1 month to check in after employing trazodone regimen with sertraline  Subjective:      Patient ID: Vadim Albarado is a 23 y o  male  HPI Vadim Albarado is a 23year old male with a PMHx significant for MDD w/ single episode who presents to the office today for a 6 week follow-up concerning his mood  Today, the patient reports that his appetite and energy level are improved  He denies any feeling of harming himself or others  The patient reports that his mood was "irritable" last week, and admits to a 2 week history of abnormal sleep after waking up intermittently over the course of the night  Patient reports history of anhedonia last week, but his motivation to complete daily tasks is much improved this week  He is currently taking Sertraline for his mood symptoms, but admits to not taking his trazodone medication due to concerns for side effects  He denies chest pain, SOB, N/V/D  The following portions of the patient's history were reviewed and updated as appropriate: allergies, current medications, past family history, past medical history, past social history, past surgical history and problem list     Review of Systems   Constitutional: Positive for appetite change (Appetite improved)  Negative for chills, fatigue and fever  Respiratory: Negative for shortness of breath  Cardiovascular: Negative for chest pain  Gastrointestinal: Negative for diarrhea, nausea and vomiting  Psychiatric/Behavioral: Positive for sleep disturbance (2 week history of interrupted sleep)  Negative for self-injury and suicidal ideas           Objective:      /74 (BP Location: Left arm, Patient Position: Sitting, Cuff Size: Large)   Pulse 88   Temp 98 3 °F (36 8 °C)   Resp 20   Wt 103 kg (226 lb)   SpO2 97%   BMI 29 82 kg/m²          Physical Exam  Constitutional:       Appearance: Normal appearance  He is normal weight  HENT:      Head: Normocephalic and atraumatic  Cardiovascular:      Rate and Rhythm: Normal rate and regular rhythm  Pulmonary:      Effort: Pulmonary effort is normal       Breath sounds: Normal breath sounds  Abdominal:      General: Bowel sounds are normal    Neurological:      Mental Status: He is alert     Psychiatric:      Comments: Irritable mood last week

## 2021-06-16 NOTE — PROGRESS NOTES
Family Medicine Follow-Up Office Visit  Linda Iglesias 23 y o  male   MRN: [id number] : 1995  ENCOUNTER: 2021 11:39 AM    Assessment and Plan   Major depressive disorder with single episode, in partial remission ([address] )  Improved nicely with sertraline, not taking trazodone  Since sleep has improved and mood is stable, will hold off on starting trazodone for now, pending ongoing re-evaluation  Seasonal allergic rhinitis due to pollen  Very likely causing his dizziness, and affecting function overall  Recommended flonase and uptitration to cetirizine from loratadine  rtc 4-6w    Chief Complaint     Chief Complaint   Patient presents with    Follow-up       History of Present Illness   Linda Iglesias is a 23y o -year-old male who presents today for followup of MDD  At our last visit, Lee Ann Hart agreed to a trial of the addition of the previously-prescribed trazodone in combination with his sertraline  Has not yet started trazodone  Overall, sleep has improved, and motivation has been pretty good  Feeling exhausted by work  Has been having dizzy spells for past 2-3 weeks  His allergies have been worse than normal as well, which coincided with the dizziness  Claritin helps to some degree, but not lately  Review of Systems   Review of Systems   Constitutional: Negative for activity change, chills, fatigue and fever  HENT: Positive for sinus pressure  Negative for congestion, sinus pain and sore throat  Respiratory: Negative for cough, shortness of breath and wheezing  Cardiovascular: Negative for chest pain, palpitations and leg swelling  Gastrointestinal: Negative for abdominal pain, diarrhea, nausea and vomiting  Genitourinary: Negative for decreased urine volume, dysuria, frequency and urgency  Musculoskeletal: Negative for arthralgias, myalgias, neck pain and neck stiffness  Skin: Negative for rash  Neurological: Positive for dizziness   Negative for light-headedness, numbness and headaches  Active Problem List     Patient Active Problem List   Diagnosis    Other fatigue    Major depressive disorder with single episode, in partial remission (HCC)    Seasonal allergic rhinitis due to pollen       Past Medical History, Past Surgical History, Family History, and Social History were reviewed and updated today as appropriate  Objective   /80   Pulse 88   Temp 98 4 °F (36 9 °C)   Ht 6' 1" (1 854 m)   Wt 103 kg (227 lb)   SpO2 98%   BMI 29 95 kg/m²     Physical Exam  Constitutional:       General: He is not in acute distress  Appearance: He is well-developed  HENT:      Head: Normocephalic and atraumatic  Nose:      Comments: Bilateral max sinus TTP  Eyes:      Pupils: Pupils are equal, round, and reactive to light  Cardiovascular:      Rate and Rhythm: Normal rate and regular rhythm  Heart sounds: Normal heart sounds  No murmur heard  No friction rub  No gallop  Pulmonary:      Effort: Pulmonary effort is normal  No respiratory distress  Breath sounds: Normal breath sounds  No wheezing or rales  Abdominal:      Palpations: Abdomen is soft  Tenderness: There is no abdominal tenderness  Musculoskeletal:         General: Normal range of motion  Cervical back: Normal range of motion and neck supple  Neurological:      Mental Status: He is alert and oriented to person, place, and time  Psychiatric:         Thought Content:  Thought content normal        Diabetic Foot Exam    Pertinent Laboratory/Diagnostic Studies:  Lab Results   Component Value Date    BUN 10 05/21/2018    CREATININE 0 98 05/21/2018    CALCIUM 9 7 05/21/2018    K 4 5 05/21/2018    CO2 28 05/21/2018     05/21/2018     Lab Results   Component Value Date    ALT 29 05/21/2018    AST 22 05/21/2018    ALKPHOS 125 (H) 05/21/2018       Lab Results   Component Value Date    WBC 7 1 05/21/2018    HGB 15 9 05/21/2018    HCT 46 2 05/21/2018    MCV 84 5 05/21/2018     05/21/2018       No results found for: TSH    No results found for: CHOL  Lab Results   Component Value Date    TRIG 149 05/21/2018     Lab Results   Component Value Date    HDL 35 (L) 05/21/2018     Lab Results   Component Value Date    LDLCALC 85 05/21/2018     No results found for: HGBA1C    Results for orders placed or performed in visit on 05/21/18   Lipid Panel with Direct LDL reflex   Result Value Ref Range    Total Cholesterol 145 <200 mg/dL    HDL 35 (L) >40 mg/dL    Triglycerides 149 <150 mg/dL    LDL Calculated 85 mg/dL (calc)    Chol HDLC Ratio 4 1 <5 0 (calc)    Non-HDL Cholesterol 110 <130 mg/dL (calc)   Comprehensive metabolic panel   Result Value Ref Range    Glucose, Random 89 65 - 99 mg/dL    BUN 10 7 - 25 mg/dL    Creatinine 0 98 0 60 - 1 35 mg/dL    eGFR Non  111 > OR = 60 mL/min/1 73m2    eGFR  128 > OR = 60 mL/min/1 73m2    SL AMB BUN/CREATININE RATIO NOT APPLICABLE 6 - 22 (calc)    Sodium 139 135 - 146 mmol/L    Potassium 4 5 3 5 - 5 3 mmol/L    Chloride 103 98 - 110 mmol/L    CO2 28 20 - 31 mmol/L    Calcium 9 7 8 6 - 10 3 mg/dL    Protein, Total 6 7 6 1 - 8 1 g/dL    Albumin 4 3 3 6 - 5 1 g/dL    Globulin 2 4 1 9 - 3 7 g/dL (calc)    Albumin/Globulin Ratio 1 8 1 0 - 2 5 (calc)    TOTAL BILIRUBIN 0 4 0 2 - 1 2 mg/dL    Alkaline Phosphatase 125 (H) 40 - 115 U/L    AST 22 10 - 40 U/L    ALT 29 9 - 46 U/L   CBC and differential   Result Value Ref Range    White Blood Cell Count 7 1 3 8 - 10 8 Thousand/uL    Red Blood Cell Count 5 47 4 20 - 5 80 Million/uL    Hemoglobin 15 9 13 2 - 17 1 g/dL    HCT 46 2 38 5 - 50 0 %    MCV 84 5 80 0 - 100 0 fL    MCH 29 1 27 0 - 33 0 pg    MCHC 34 4 32 0 - 36 0 g/dL    RDW 12 1 11 0 - 15 0 %    Platelet Count 639 506 - 400 Thousand/uL    SL AMB MPV 10 4 7 5 - 12 5 fL    Neutrophils (Absolute) 3,834 1,500 - 7,800 cells/uL    Lymphocytes (Absolute) 2,499 850 - 3,900 cells/uL    Monocytes (Absolute) 575 200 - 950 cells/uL    Eosinophils (Absolute) 114 15 - 500 cells/uL    Basophils ABS 78 0 - 200 cells/uL    Neutrophils 54 %    Lymphocytes 35 2 %    Monocytes 8 1 %    Eosinophils 1 6 %    Basophils PCT 1 1 %   TSH, 3rd generation with T4 reflex   Result Value Ref Range    TSH W/RFX TO FREE T4 1 92 0 40 - 4 50 mIU/L       No orders of the defined types were placed in this encounter  Current Medications     Current Outpatient Medications   Medication Sig Dispense Refill    traZODone (DESYREL) 50 mg tablet Take 0 5 tablets (25 mg total) by mouth daily at bedtime 45 tablet 1    fluticasone (FLONASE) 50 mcg/act nasal spray 2 sprays into each nostril daily 16 g 2     No current facility-administered medications for this visit         ALLERGIES:  Allergies   Allergen Reactions    Kiwi Extract - Food Allergy Anaphylaxis     Throat swelling, difficulty breathing, tongue swelling    Pineapple - Food Allergy Anaphylaxis     Throat swelling, difficulty breathing, tongue swelling       Health Maintenance     Health Maintenance   Topic Date Due    Hepatitis C Screening  Never done    HIV Screening  Never done    Annual Physical  Never done    DTaP,Tdap,and Td Vaccines (7 - Td or Tdap) 07/21/2020    Influenza Vaccine (Season Ended) 09/01/2021    BMI: Followup Plan  03/01/2022    Depression Remission PHQ  03/01/2022    BMI: Adult  06/17/2022    HIB Vaccine  Completed    Hepatitis B Vaccine  Completed    IPV Vaccine  Completed    Meningococcal ACWY Vaccine  Completed    HPV Vaccine  Completed    COVID-19 Vaccine  Completed    Pneumococcal Vaccine: Pediatrics (0 to 5 Years) and At-Risk Patients (6 to 59 Years)  Aged Out    Hepatitis A Vaccine  Aged [employer] History   Administered Date(s) Administered    DTaP 5 1997, 1997, 02/16/1998, 02/11/1999, 05/01/2003    HPV Quadrivalent 08/08/2013, 09/17/2013, 02/11/2014    Hep B, Adolescent or Pediatric 1997, 1997, 05/14/1998    Hib (PRP-T) 1997, 1997, 02/11/1999    INFLUENZA 09/21/2012, 09/30/2013    IPV 1997, 1997, 02/11/1999, 07/06/2004    MMR 11/12/1998, 05/01/2003    Meningococcal MCV4P 07/04/2010, 08/08/2013    Meningococcal, Unknown Serogroups 07/21/2010, 08/08/2013    Sars-cov-2 / Covid-19 vector-nr, [id number] vaccine [facility] [place]) 05/16/2021    Tdap 07/21/2010    Varicella 08/02/1999, 08/13/2009         Sofi Betancourt MD   [address]  6/17/2021  11:39 AM    Parts of this note were dictated using [employer] dictation software and may have sounds-like errors due to variation in pronunciation

## 2021-06-17 ENCOUNTER — OFFICE VISIT (OUTPATIENT)
Dept: FAMILY MEDICINE CLINIC | Facility: CLINIC | Age: 26
End: 2021-06-17
Payer: COMMERCIAL

## 2021-06-17 VITALS
TEMPERATURE: 98.4 F | HEART RATE: 88 BPM | OXYGEN SATURATION: 98 % | WEIGHT: 227 LBS | SYSTOLIC BLOOD PRESSURE: 120 MMHG | HEIGHT: 73 IN | BODY MASS INDEX: 30.09 KG/M2 | DIASTOLIC BLOOD PRESSURE: 80 MMHG

## 2021-06-17 DIAGNOSIS — J30.1 SEASONAL ALLERGIC RHINITIS DUE TO POLLEN: ICD-10-CM

## 2021-06-17 DIAGNOSIS — F32.4 MAJOR DEPRESSIVE DISORDER WITH SINGLE EPISODE, IN PARTIAL REMISSION (HCC): Primary | ICD-10-CM

## 2021-06-17 PROCEDURE — 99214 OFFICE O/P EST MOD 30 MIN: CPT | Performed by: FAMILY MEDICINE

## 2021-06-17 PROCEDURE — 1036F TOBACCO NON-USER: CPT | Performed by: FAMILY MEDICINE

## 2021-06-17 PROCEDURE — 3008F BODY MASS INDEX DOCD: CPT | Performed by: FAMILY MEDICINE

## 2021-06-17 RX ORDER — FLUTICASONE PROPIONATE 50 MCG
2 SPRAY, SUSPENSION (ML) NASAL DAILY
Qty: 16 G | Refills: 2 | Status: SHIPPED | OUTPATIENT
Start: 2021-06-17

## 2021-06-17 NOTE — ASSESSMENT & PLAN NOTE
Improved nicely with sertraline, not taking trazodone  Since sleep has improved and mood is stable, will hold off on starting trazodone for now, pending ongoing re-evaluation

## 2021-06-17 NOTE — ASSESSMENT & PLAN NOTE
Very likely causing his dizziness, and affecting function overall  Recommended flonase and uptitration to cetirizine from loratadine

## 2021-07-07 NOTE — PROGRESS NOTES
[facility]        NAME: Reyes Briscoe is a 23 y o  male  : 1995    MRN: [id number]  DATE: 2021  TIME: 9:57 AM    Assessment and Plan   Anorexia nervosa [F50 00]  1  Anorexia nervosa  Transfer to other facility         Patient Instructions       Follow up with PCP in 3-5 days  Proceed to  ER if symptoms worsen  Chief Complaint     Chief Complaint   Patient presents with    Anorexia     Pt states loss of appetite x 3 days  Denies n/v/d or abdominal pain, normal BM and urination  States he believes its anxiety related  History of Present Illness         23years old male presents for evaluation loss of appetite for 3 days  he has severe anxiety, has been loss of  Appetite and not eating food for 3 days  He denied nausea, vomiting, abdominal pain  Review of Systems   Review of Systems   Constitutional: Positive for activity change and appetite change  HENT: Negative  Eyes: Negative  Respiratory: Negative  Cardiovascular: Negative  Gastrointestinal: Negative  All other systems reviewed and are negative  Current Medications       Current Outpatient Medications:     fluticasone (FLONASE) 50 mcg/act nasal spray, 2 sprays into each nostril daily, Disp: 16 g, Rfl: 2    traZODone (DESYREL) 50 mg tablet, Take 0 5 tablets (25 mg total) by mouth daily at bedtime, Disp: 45 tablet, Rfl: 1    Current Allergies     Allergies as of 2021    (No Known Allergies)            The following portions of the patient's history were reviewed and updated as appropriate: allergies, current medications, past family history, past medical history, past social history, past surgical history and problem list      History reviewed  No pertinent past medical history      Past Surgical History:   Procedure Laterality Date    FOOT SURGERY      TYMPANOSTOMY TUBE PLACEMENT  2006    WISDOM TOOTH EXTRACTION  2014       Family History   Problem Relation Age of Onset    Diabetes Other     Cancer Other     Arthritis Other     Hypertension Other     Heart disease Other     No Known Problems Mother     No Known Problems Father     No Known Problems Sister          Medications have been verified  Objective   /67   Pulse 92   Temp 97 9 °F (36 6 °C)   Resp 16   Ht 6' 1" (1 854 m)   Wt 97 5 kg (215 lb)   SpO2 99%   BMI 28 37 kg/m²        Physical Exam     Physical Exam  Vitals and nursing note reviewed  Constitutional:       General: He is in acute distress  Appearance: He is well-developed and normal weight  He is ill-appearing  HENT:      Head: Normocephalic and atraumatic  Cardiovascular:      Rate and Rhythm: Normal rate and regular rhythm  Pulmonary:      Effort: Pulmonary effort is normal       Breath sounds: Normal breath sounds  Abdominal:      General: Abdomen is flat  Bowel sounds are normal       Palpations: Abdomen is soft  Tenderness: There is no guarding or rebound  Musculoskeletal:      Cervical back: Normal range of motion and neck supple

## 2021-07-26 ENCOUNTER — OFFICE VISIT (OUTPATIENT)
Dept: FAMILY MEDICINE CLINIC | Facility: CLINIC | Age: 26
End: 2021-07-26
Payer: COMMERCIAL

## 2021-07-26 VITALS
OXYGEN SATURATION: 99 % | BODY MASS INDEX: 31.14 KG/M2 | DIASTOLIC BLOOD PRESSURE: 70 MMHG | WEIGHT: 235 LBS | HEIGHT: 73 IN | SYSTOLIC BLOOD PRESSURE: 130 MMHG | TEMPERATURE: 97.6 F | HEART RATE: 104 BPM

## 2021-07-26 DIAGNOSIS — F32.4 MAJOR DEPRESSIVE DISORDER WITH SINGLE EPISODE, IN PARTIAL REMISSION (HCC): Primary | ICD-10-CM

## 2021-07-26 DIAGNOSIS — Z11.3 ROUTINE SCREENING FOR STI (SEXUALLY TRANSMITTED INFECTION): ICD-10-CM

## 2021-07-26 DIAGNOSIS — N48.89 PENILE IRRITATION: ICD-10-CM

## 2021-07-26 PROCEDURE — 1036F TOBACCO NON-USER: CPT | Performed by: FAMILY MEDICINE

## 2021-07-26 PROCEDURE — 99214 OFFICE O/P EST MOD 30 MIN: CPT | Performed by: FAMILY MEDICINE

## 2021-07-26 NOTE — PROGRESS NOTES
Assessment/Plan:    Major depressive disorder with single episode, in partial remission ([facility])  Mood is stable, currently taking 50mg sertraline/day with good result - continue taking sertraline as prescribed  Follow up in 3 months, call the office if mood worsens or having thoughts of harming self or others  Penile irritation  Slightly raised, red papule on ventral surface of penis  Painful to the touch, described as a "stretching" sensation - onset within the last week - referral to urology & screening for Chlamydia, HIV, Hepatitis B       Diagnoses and all orders for this visit:    Major depressive disorder with single episode, in partial remission ([facility] )    Penile irritation  -     Cancel: Ambulatory referral to Urology; Future  -     Ambulatory referral to Urology; Future    Routine screening for STI (sexually transmitted infection)  -     Chlamydia/GC amplified DNA by PCR; Future  -     HIV 1/2 Antigen/Antibody (4th Generation) w Reflex [facility]; Future  -     Hepatitis B surface antigen; Future          Subjective:      Patient ID: Anali Dior is a 27 y o  male  HPI  Anali Dior presents to the family medicine clinic today for a follow up of Major Depressive Disorder  He is currently stable on his regimen of sertraline 50mg/day  He reports no recent episodes of major depression  The patient reports a recent move to [place], [place] which has been affecting his sleep, causing him to only get ~6 hours/night  We counseled him on the importance of getting at least 6 hours of sleep/night in order to consolidate memory and that trying to get another hour per night would be beneficial as well  The patient reports moderate productivity, involving completion of the move to [place] with his family as well as participating in his [address] group  The patient also presents the new concern of an irritation on his penis   He denies any new sexual partners however he has recently been active with his normal group of 3-4 other men and women  He states that none of these partners have mentioned any STIs or history of STIs and that they normally communicate openly about such matters  This penile irritation is located on the ventral aspect of his penis, is a slightly raised, red papule that is irritated by physical contact  He rates this irritation at a 4/10  Pertinent negatives include no dysuria, hematuria or ulceration  The patient also notes that he has had another "cyst" like lesion on his penis, distal to where this new one has formed  He states the old lesion has been examined before and was not of significant concern  We examined both the old and new lesions and suggested he be referred to a urologist, as well as receive chlamydia, HIV & Hepatitis B screenings completed  The patient voices understanding and consent  Follow up in 3 months  The following portions of the patient's history were reviewed and updated as appropriate: allergies, current medications, past medical history, past social history and problem list     Review of Systems   Constitutional: Negative for fever  HENT: Negative for rhinorrhea and sore throat  Eyes:        Wears glasses   Respiratory: Negative for chest tightness and shortness of breath  Cardiovascular: Negative for chest pain  Gastrointestinal: Negative for abdominal pain  Genitourinary: Positive for genital sores and penile pain (4/10)  Negative for dysuria and hematuria  Skin:        Penile irritation   Psychiatric/Behavioral: Negative for dysphoric mood  Objective:      /70   Pulse 104   Temp 97 6 °F (36 4 °C)   Ht 6' 1" (1 854 m)   Wt 107 kg (235 lb)   SpO2 99%   BMI 31 00 kg/m²          Physical Exam  Eyes:      Extraocular Movements: Extraocular movements intact  Conjunctiva/sclera: Conjunctivae normal       Pupils: Pupils are equal, round, and reactive to light  Cardiovascular:      Rate and Rhythm: Normal rate and regular rhythm        Heart sounds: Normal heart sounds  Pulmonary:      Effort: Pulmonary effort is normal       Breath sounds: Normal breath sounds  Genitourinary:     Comments: Slightly raised, red papule on ventral surface of penis  Skin:     General: Skin is dry  Neurological:      Mental Status: He is alert     Psychiatric:         Behavior: Behavior normal

## 2021-07-26 NOTE — ASSESSMENT & PLAN NOTE
Slightly raised, red papule on ventral surface of penis   Painful to the touch, described as a "stretching" sensation - onset within the last week - referral to urology & screening for Chlamydia, HIV, Hepatitis B

## 2021-07-26 NOTE — ASSESSMENT & PLAN NOTE
Mood is stable, currently taking 50mg sertraline/day with good result - continue taking sertraline as prescribed  Follow up in 3 months, call the office if mood worsens or having thoughts of harming self or others

## 2021-07-27 ENCOUNTER — TELEPHONE (OUTPATIENT)
Dept: UROLOGY | Facility: HOSPITAL | Age: 26
End: 2021-07-27

## 2021-07-27 NOTE — TELEPHONE ENCOUNTER
LM to offer OV for N48 89 (ICD-10-CM) - Penile irritation PCP referral   Please schedule with [facility]end[facility] PT when call is returned

## 2021-09-14 DIAGNOSIS — F32.4 MAJOR DEPRESSIVE DISORDER WITH SINGLE EPISODE, IN PARTIAL REMISSION (HCC): Primary | ICD-10-CM

## 2021-10-21 LAB
HBV SURFACE AG SERPL QL IA: NEGATIVE
HIV 1+2 AB+HIV1 P24 AG SERPL QL IA: NON REACTIVE

## 2021-10-22 LAB
C TRACH RRNA SPEC QL NAA+PROBE: NEGATIVE
N GONORRHOEA RRNA SPEC QL NAA+PROBE: NEGATIVE

## 2021-10-28 ENCOUNTER — OFFICE VISIT (OUTPATIENT)
Dept: FAMILY MEDICINE CLINIC | Facility: CLINIC | Age: 26
End: 2021-10-28
Payer: COMMERCIAL

## 2021-10-28 VITALS
TEMPERATURE: 98.7 F | SYSTOLIC BLOOD PRESSURE: 120 MMHG | BODY MASS INDEX: 31.81 KG/M2 | WEIGHT: 240 LBS | HEIGHT: 73 IN | OXYGEN SATURATION: 97 % | HEART RATE: 79 BPM | DIASTOLIC BLOOD PRESSURE: 78 MMHG

## 2021-10-28 DIAGNOSIS — B35.1 ONYCHOMYCOSIS OF GREAT TOE: ICD-10-CM

## 2021-10-28 DIAGNOSIS — F32.4 MAJOR DEPRESSIVE DISORDER WITH SINGLE EPISODE, IN PARTIAL REMISSION (HCC): Primary | ICD-10-CM

## 2021-10-28 PROCEDURE — 3008F BODY MASS INDEX DOCD: CPT | Performed by: FAMILY MEDICINE

## 2021-10-28 PROCEDURE — 99214 OFFICE O/P EST MOD 30 MIN: CPT | Performed by: FAMILY MEDICINE

## 2023-03-02 ENCOUNTER — OFFICE VISIT (OUTPATIENT)
Dept: FAMILY MEDICINE CLINIC | Facility: CLINIC | Age: 28
End: 2023-03-02

## 2023-03-02 VITALS
DIASTOLIC BLOOD PRESSURE: 86 MMHG | OXYGEN SATURATION: 99 % | HEIGHT: 73 IN | BODY MASS INDEX: 32.47 KG/M2 | HEART RATE: 85 BPM | WEIGHT: 245 LBS | SYSTOLIC BLOOD PRESSURE: 130 MMHG

## 2023-03-02 DIAGNOSIS — Z02.89 ENCOUNTER FOR OCCUPATIONAL PHYSICAL EXAMINATION: Primary | ICD-10-CM

## 2023-03-02 NOTE — PROGRESS NOTES
Family Medicine Office Visit  Nestor Gonzalez 24 y o  male   MRN: [id number] : 1995  ENCOUNTER: 3/2/2023 2:41 PM    Assessment and Plan   Major depressive disorder with single episode, in partial [facility]   Patient states his symptoms primarily are periods of anxiety and have been very minimal and much more short-lived than they used to be  They primarily revolve around periodic stressful days  His job  can be stressful so he is requesting to have access to periodic days off primarily for better self-care management  Discussed obtaining mental health professional to follow-up with close to home  Patient was advised to return to the office as needed  Seasonal allergic rhinitis due to pollen   Physical finding reflect this  Did not been bad enough that the patient needs to use his Flonase  Patient is not using any OTC medications on a regular basis  He does feel his current dry household environment does aggravate his condition  Patient may use nasal saline spray and Flonase as needed  Chief Complaint     Chief Complaint   Patient presents with   • Physical Exam       History of Present Illness   Nestor Gonzalez is a 24y o -year-old male who presents today for evaluation  The patient's only major health concern at this point time is periods of anxiety with which he states he is making excellent personal progress with  The stressful periods are far shorter, he currently only medicates with sertraline 50 daily  He does not smoke vape or use extensive alcohol or any illicit drugs  He is not currently following with mental health and he is working out of state  His [facility] request generally is to have access to periodic days off for better self-care  Review of Systems   Review of Systems   Constitutional: Positive for fatigue (fluctuates)  Negative for chills and fever  Patient is non-smoker  Does not vape, uses alcohol very sparingly and denies use of any street drugs  Respiratory: Negative for cough and shortness of breath  Cardiovascular: Negative for chest pain and palpitations  Gastrointestinal: Positive for nausea (some AMs)  Negative for diarrhea, rectal pain and vomiting  Neurological: Positive for dizziness (occasional AMs)  Negative for light-headedness and headaches  Active Problem List     Patient Active Problem List   Diagnosis   • Other fatigue   • Major depressive disorder with single episode, in partial remission (HCC)   • Seasonal allergic rhinitis due to pollen   • Penile irritation   • Onychomycosis of great toe   • Encounter for occupational physical examination       Past Medical History, Past Surgical History, Family History, and Social History were reviewed and updated today as appropriate  Objective   /86   Pulse 85   Ht 6' 1" (1 854 m)   Wt 111 kg (245 lb)   SpO2 99%   BMI 32 32 kg/m²     Physical Exam  Constitutional:       General: He is not in acute distress  HENT:      Head: Normocephalic  Ears:      Comments: Serous OM laterally     Nose: No rhinorrhea  Comments: Turbinates injected bilaterally     Mouth/Throat:      Mouth: Mucous membranes are moist       Pharynx: No oropharyngeal exudate  Eyes:      General: No scleral icterus  Extraocular Movements: Extraocular movements intact  Cardiovascular:      Rate and Rhythm: Normal rate and regular rhythm  Heart sounds: No murmur heard  Pulmonary:      Effort: No respiratory distress  Breath sounds: No wheezing or rales  Abdominal:      Palpations: Abdomen is soft  Tenderness: There is no abdominal tenderness  Musculoskeletal:      Right lower leg: No edema  Left lower leg: No edema  Skin:     General: Skin is warm and dry  Neurological:      General: No focal deficit present  Mental Status: He is alert  Psychiatric:         Mood and Affect: Mood normal          Behavior: Behavior normal          Thought Content:  Thought content normal          Judgment: Judgment normal            Pertinent Laboratory/Diagnostic Studies:  Lab Results   Component Value Date    BUN 10 05/21/2018    CREATININE 0 98 05/21/2018    CALCIUM 9 7 05/21/2018    K 4 5 05/21/2018    CO2 28 05/21/2018     05/21/2018     Lab Results   Component Value Date    ALT 29 05/21/2018    AST 22 05/21/2018    ALKPHOS 125 (H) 05/21/2018       Lab Results   Component Value Date    WBC 7 1 05/21/2018    HGB 15 9 05/21/2018    HCT 46 2 05/21/2018    MCV 84 5 05/21/2018     05/21/2018       No results found for: TSH    No results found for: CHOL  Lab Results   Component Value Date    TRIG 149 05/21/2018     Lab Results   Component Value Date    HDL 35 (L) 05/21/2018     Lab Results   Component Value Date    LDLCALC 85 05/21/2018     No results found for: HGBA1C    Results for orders placed or performed in visit on 10/20/21   Chlamydia/GC amplified DNA by PCR    UR   Result Value Ref Range    Chlamydia trachomatis, ERYN Negative Negative    Neisseria Gonorrhoeae, ERYN Negative Negative   Human Immunodeficiency Virus 1/2 Antigen / Antibody ( Fourth Generation) with Reflex Testing   Result Value Ref Range    HIV Screen 4th Generation wRflx Non Reactive Non Reactive   Hepatitis B surface antigen   Result Value Ref Range    HBsAg Screen Negative Negative       No orders of the defined types were placed in this encounter  Current Medications     Current Outpatient Medications   Medication Sig Dispense Refill   • sertraline (ZOLOFT) 50 mg tablet Take 1 tablet (50 mg total) by mouth daily 90 tablet 2   • fluticasone (FLONASE) 50 mcg/act nasal spray 2 sprays into each nostril daily (Patient not taking: Reported on 7/26/2021) 16 g 2     No current facility-administered medications for this visit         ALLERGIES:  Allergies   Allergen Reactions   • Kiwi Extract - Food Allergy Anaphylaxis     Throat swelling, difficulty breathing, tongue swelling   • Pineapple - Food Allergy Anaphylaxis     Throat swelling, difficulty breathing, tongue swelling       Health Maintenance     Health Maintenance   Topic Date Due   • Hepatitis C Screening  Never done   • Annual Physical  Never done   • DTaP,Tdap,and Td Vaccines (7 - Td or Tdap) 07/21/2020   • COVID-19 Vaccine (2 - Booster for Pham series) 07/11/2021   • BMI: Followup Plan  03/01/2022   • Influenza Vaccine (1) 09/01/2022   • Depression Remission PHQ  09/02/2023   • BMI: Adult  03/02/2024   • HIV Screening  Completed   • HIB Vaccine  Completed   • IPV Vaccine  Completed   • Meningococcal ACWY Vaccine  Completed   • HPV Vaccine  Completed   • Pneumococcal Vaccine: Pediatrics (0 to 5 Years) and At-Risk Patients (6 to 59 Years)  Aged Out   • Hepatitis A Vaccine  Aged [employer] History   Administered Date(s) Administered   • COVID-19 J&J (Pham) vaccine 0 5 mL 05/16/2021   • DTaP 5 1997, 1997, 02/16/1998, 02/11/1999, 05/01/2003   • HPV Quadrivalent 08/08/2013, 09/17/2013, 02/11/2014   • Hep B, Adolescent or Pediatric 1997, 1997, 05/14/1998   • Hib (PRP-T) 1997, 1997, 02/11/1999   • INFLUENZA 09/21/2012, 09/30/2013   • IPV 1997, 1997, 02/11/1999, 07/06/2004   • MMR 11/12/1998, 05/01/2003   • Meningococcal MCV4P 07/04/2010, 08/08/2013   • Meningococcal, Unknown Serogroups 07/21/2010, 08/08/2013   • Tdap 07/21/2010   • Varicella 08/02/1999, 08/13/2009         Mango Newman MD   [address]  3/2/2023  2:41 PM    Parts of this note were dictated using [employer] dictation software and may have sounds-like errors due to variation in pronunciation

## 2023-03-02 NOTE — ASSESSMENT & PLAN NOTE
Physical finding reflect this  Did not been bad enough that the patient needs to use his Flonase  Patient is not using any OTC medications on a regular basis  He does feel his current dry household environment does aggravate his condition  Patient may use nasal saline spray and Flonase as needed

## 2023-03-02 NOTE — ASSESSMENT & PLAN NOTE
Patient states his symptoms primarily are periods of anxiety and have been very minimal and much more short-lived than they used to be  They primarily revolve around periodic stressful days  His job  can be stressful so he is requesting to have access to periodic days off primarily for better self-care management  Discussed obtaining mental health professional to follow-up with close to home  Patient was advised to return to the office as needed

## 2023-03-14 NOTE — TELEPHONE ENCOUNTER
Pt's Mother called to make a follow up appt with you before he goes back to college on 8/17  You only have [facility] spots up to then  Would it be ok for 7/26 at 10:30A?   Please let me know   Thank you  Farhat Cox Valtrex Counseling: I discussed with the patient the risks of valacyclovir including but not limited to kidney damage, nausea, vomiting and severe allergy.  The patient understands that if the infection seems to be worsening or is not improving, they are to call.